# Patient Record
Sex: MALE | Race: BLACK OR AFRICAN AMERICAN | Employment: OTHER | URBAN - METROPOLITAN AREA
[De-identification: names, ages, dates, MRNs, and addresses within clinical notes are randomized per-mention and may not be internally consistent; named-entity substitution may affect disease eponyms.]

---

## 2020-12-18 ENCOUNTER — APPOINTMENT (OUTPATIENT)
Dept: GENERAL RADIOLOGY | Age: 60
End: 2020-12-18
Attending: EMERGENCY MEDICINE

## 2020-12-18 ENCOUNTER — APPOINTMENT (OUTPATIENT)
Dept: CT IMAGING | Age: 60
End: 2020-12-18
Attending: EMERGENCY MEDICINE

## 2020-12-18 ENCOUNTER — HOSPITAL ENCOUNTER (EMERGENCY)
Age: 60
Discharge: HOME | End: 2020-12-18
Attending: EMERGENCY MEDICINE

## 2020-12-18 VITALS
HEIGHT: 71 IN | HEART RATE: 98 BPM | OXYGEN SATURATION: 97 % | RESPIRATION RATE: 16 BRPM | DIASTOLIC BLOOD PRESSURE: 74 MMHG | WEIGHT: 228 LBS | BODY MASS INDEX: 31.92 KG/M2 | SYSTOLIC BLOOD PRESSURE: 116 MMHG

## 2020-12-18 DIAGNOSIS — R56.9 SEIZURE (HCC): Primary | ICD-10-CM

## 2020-12-18 DIAGNOSIS — T50.905A MEDICATION REACTION, INITIAL ENCOUNTER: ICD-10-CM

## 2020-12-18 DIAGNOSIS — M18.11 PRIMARY OSTEOARTHRITIS OF FIRST CARPOMETACARPAL JOINT OF RIGHT HAND: ICD-10-CM

## 2020-12-18 LAB
ALBUMIN SERPL-MCNC: 3.6 G/DL (ref 3.4–5)
ALBUMIN/GLOB SERPL: 1.1 {RATIO} (ref 0.8–1.7)
ALP SERPL-CCNC: 81 U/L (ref 45–117)
ALT SERPL-CCNC: 70 U/L (ref 16–61)
ANION GAP SERPL CALC-SCNC: 10 MMOL/L (ref 3–18)
APPEARANCE UR: ABNORMAL
AST SERPL-CCNC: 79 U/L (ref 10–38)
BACTERIA URNS QL MICRO: ABNORMAL /HPF
BASOPHILS # BLD: 0 K/UL (ref 0–0.1)
BASOPHILS NFR BLD: 0 % (ref 0–2)
BILIRUB SERPL-MCNC: 1.2 MG/DL (ref 0.2–1)
BILIRUB UR QL: NEGATIVE
BUN SERPL-MCNC: 17 MG/DL (ref 7–18)
BUN/CREAT SERPL: 10 (ref 12–20)
CALCIUM SERPL-MCNC: 9.3 MG/DL (ref 8.5–10.1)
CHLORIDE SERPL-SCNC: 100 MMOL/L (ref 100–111)
CK MB CFR SERPL CALC: 0.6 % (ref 0–4)
CK MB SERPL-MCNC: 1.4 NG/ML (ref 5–25)
CK SERPL-CCNC: 220 U/L (ref 39–308)
CO2 SERPL-SCNC: 30 MMOL/L (ref 21–32)
COLOR UR: ABNORMAL
CREAT SERPL-MCNC: 1.73 MG/DL (ref 0.6–1.3)
DIFFERENTIAL METHOD BLD: ABNORMAL
EOSINOPHIL # BLD: 0.1 K/UL (ref 0–0.4)
EOSINOPHIL NFR BLD: 1 % (ref 0–5)
EPITH CASTS URNS QL MICRO: ABNORMAL /LPF
EPITH CASTS URNS QL MICRO: ABNORMAL /LPF (ref 0–5)
ERYTHROCYTE [DISTWIDTH] IN BLOOD BY AUTOMATED COUNT: 14.9 % (ref 11.6–14.5)
ETHANOL SERPL-MCNC: <3 MG/DL (ref 0–3)
GLOBULIN SER CALC-MCNC: 3.4 G/DL (ref 2–4)
GLUCOSE SERPL-MCNC: 225 MG/DL (ref 74–99)
GLUCOSE UR STRIP.AUTO-MCNC: 250 MG/DL
HCT VFR BLD AUTO: 31.8 % (ref 36–48)
HGB BLD-MCNC: 10.4 G/DL (ref 13–16)
HGB UR QL STRIP: ABNORMAL
HYALINE CASTS URNS QL MICRO: ABNORMAL /LPF (ref 0–2)
KETONES UR QL STRIP.AUTO: ABNORMAL MG/DL
LEUKOCYTE ESTERASE UR QL STRIP.AUTO: NEGATIVE
LYMPHOCYTES # BLD: 4.4 K/UL (ref 0.9–3.6)
LYMPHOCYTES NFR BLD: 41 % (ref 21–52)
MAGNESIUM SERPL-MCNC: 2 MG/DL (ref 1.6–2.6)
MCH RBC QN AUTO: 28.1 PG (ref 24–34)
MCHC RBC AUTO-ENTMCNC: 32.7 G/DL (ref 31–37)
MCV RBC AUTO: 85.9 FL (ref 74–97)
MONOCYTES # BLD: 0.9 K/UL (ref 0.05–1.2)
MONOCYTES NFR BLD: 8 % (ref 3–10)
NEUTS SEG # BLD: 5.4 K/UL (ref 1.8–8)
NEUTS SEG NFR BLD: 50 % (ref 40–73)
NITRITE UR QL STRIP.AUTO: NEGATIVE
PH UR STRIP: 5 [PH] (ref 5–8)
PLATELET # BLD AUTO: 249 K/UL (ref 135–420)
PMV BLD AUTO: 10.3 FL (ref 9.2–11.8)
POTASSIUM SERPL-SCNC: 3.5 MMOL/L (ref 3.5–5.5)
PROT SERPL-MCNC: 7 G/DL (ref 6.4–8.2)
PROT UR STRIP-MCNC: 300 MG/DL
RBC # BLD AUTO: 3.7 M/UL (ref 4.7–5.5)
RBC #/AREA URNS HPF: ABNORMAL /HPF (ref 0–5)
SODIUM SERPL-SCNC: 140 MMOL/L (ref 136–145)
SP GR UR REFRACTOMETRY: 1.02 (ref 1–1.03)
TROPONIN I SERPL-MCNC: 0.02 NG/ML (ref 0–0.04)
URATE SERPL-MCNC: 8.6 MG/DL (ref 2.6–7.2)
UROBILINOGEN UR QL STRIP.AUTO: 1 EU/DL (ref 0.2–1)
WBC # BLD AUTO: 10.8 K/UL (ref 4.6–13.2)
WBC URNS QL MICRO: ABNORMAL /HPF (ref 0–5)

## 2020-12-18 PROCEDURE — 85025 COMPLETE CBC W/AUTO DIFF WBC: CPT

## 2020-12-18 PROCEDURE — 82550 ASSAY OF CK (CPK): CPT

## 2020-12-18 PROCEDURE — 94762 N-INVAS EAR/PLS OXIMTRY CONT: CPT

## 2020-12-18 PROCEDURE — 73110 X-RAY EXAM OF WRIST: CPT

## 2020-12-18 PROCEDURE — 80053 COMPREHEN METABOLIC PANEL: CPT

## 2020-12-18 PROCEDURE — 81001 URINALYSIS AUTO W/SCOPE: CPT

## 2020-12-18 PROCEDURE — 93005 ELECTROCARDIOGRAM TRACING: CPT

## 2020-12-18 PROCEDURE — 83735 ASSAY OF MAGNESIUM: CPT

## 2020-12-18 PROCEDURE — 84550 ASSAY OF BLOOD/URIC ACID: CPT

## 2020-12-18 PROCEDURE — 80307 DRUG TEST PRSMV CHEM ANLYZR: CPT

## 2020-12-18 PROCEDURE — 70450 CT HEAD/BRAIN W/O DYE: CPT

## 2020-12-18 PROCEDURE — 99285 EMERGENCY DEPT VISIT HI MDM: CPT

## 2020-12-18 RX ORDER — PREDNISONE 20 MG/1
60 TABLET ORAL DAILY
Qty: 15 TAB | Refills: 0 | Status: SHIPPED | OUTPATIENT
Start: 2020-12-18 | End: 2020-12-30

## 2020-12-19 ENCOUNTER — HOSPITAL ENCOUNTER (INPATIENT)
Age: 60
LOS: 11 days | Discharge: HOME | DRG: 682 | End: 2020-12-30
Attending: EMERGENCY MEDICINE | Admitting: FAMILY MEDICINE

## 2020-12-19 ENCOUNTER — APPOINTMENT (OUTPATIENT)
Dept: NUCLEAR MEDICINE | Age: 60
DRG: 682 | End: 2020-12-19
Attending: EMERGENCY MEDICINE

## 2020-12-19 ENCOUNTER — APPOINTMENT (OUTPATIENT)
Dept: GENERAL RADIOLOGY | Age: 60
DRG: 682 | End: 2020-12-19
Attending: EMERGENCY MEDICINE

## 2020-12-19 DIAGNOSIS — N17.9 ACUTE RENAL FAILURE, UNSPECIFIED ACUTE RENAL FAILURE TYPE (HCC): Primary | ICD-10-CM

## 2020-12-19 DIAGNOSIS — R94.31 ABNORMAL EKG: ICD-10-CM

## 2020-12-19 DIAGNOSIS — I95.9 HYPOTENSION, UNSPECIFIED HYPOTENSION TYPE: ICD-10-CM

## 2020-12-19 PROBLEM — R77.8 ELEVATED TROPONIN: Status: ACTIVE | Noted: 2020-12-19

## 2020-12-19 PROBLEM — R09.02 HYPOXIA: Status: ACTIVE | Noted: 2020-12-19

## 2020-12-19 LAB
ALBUMIN SERPL-MCNC: 3.7 G/DL (ref 3.4–5)
ALBUMIN/GLOB SERPL: 0.9 {RATIO} (ref 0.8–1.7)
ALP SERPL-CCNC: 82 U/L (ref 45–117)
ALT SERPL-CCNC: 98 U/L (ref 16–61)
ANION GAP SERPL CALC-SCNC: 14 MMOL/L (ref 3–18)
ARTERIAL PATENCY WRIST A: ABNORMAL
AST SERPL-CCNC: 69 U/L (ref 10–38)
ATRIAL RATE: 106 BPM
BASE EXCESS BLD CALC-SCNC: 2 MMOL/L
BASOPHILS # BLD: 0 K/UL (ref 0–0.1)
BASOPHILS NFR BLD: 0 % (ref 0–2)
BDY SITE: ABNORMAL
BILIRUB DIRECT SERPL-MCNC: 0.2 MG/DL (ref 0–0.2)
BILIRUB SERPL-MCNC: 1 MG/DL (ref 0.2–1)
BODY TEMPERATURE: 98
BUN SERPL-MCNC: 42 MG/DL (ref 7–18)
BUN/CREAT SERPL: 7 (ref 12–20)
CALCIUM SERPL-MCNC: 9 MG/DL (ref 8.5–10.1)
CALCULATED P AXIS, ECG09: 67 DEGREES
CALCULATED R AXIS, ECG10: 81 DEGREES
CALCULATED T AXIS, ECG11: 32 DEGREES
CHLORIDE SERPL-SCNC: 99 MMOL/L (ref 100–111)
CK MB CFR SERPL CALC: 0.8 % (ref 0–4)
CK MB SERPL-MCNC: 1.1 NG/ML (ref 5–25)
CK SERPL-CCNC: 134 U/L (ref 39–308)
CO2 SERPL-SCNC: 27 MMOL/L (ref 21–32)
CREAT SERPL-MCNC: 5.68 MG/DL (ref 0.6–1.3)
D DIMER PPP FEU-MCNC: 3 UG/ML(FEU)
DIAGNOSIS, 93000: NORMAL
DIFFERENTIAL METHOD BLD: ABNORMAL
EOSINOPHIL # BLD: 0 K/UL (ref 0–0.4)
EOSINOPHIL NFR BLD: 0 % (ref 0–5)
ERYTHROCYTE [DISTWIDTH] IN BLOOD BY AUTOMATED COUNT: 15.2 % (ref 11.6–14.5)
GAS FLOW.O2 O2 DELIVERY SYS: ABNORMAL L/MIN
GAS FLOW.O2 SETTING OXYMISER: 3.5 L/M
GLOBULIN SER CALC-MCNC: 3.9 G/DL (ref 2–4)
GLUCOSE SERPL-MCNC: 171 MG/DL (ref 74–99)
HCO3 BLD-SCNC: 26.6 MMOL/L (ref 22–26)
HCT VFR BLD AUTO: 28.9 % (ref 36–48)
HGB BLD-MCNC: 9.5 G/DL (ref 13–16)
LYMPHOCYTES # BLD: 2.3 K/UL (ref 0.9–3.6)
LYMPHOCYTES NFR BLD: 16 % (ref 21–52)
MAGNESIUM SERPL-MCNC: 2.2 MG/DL (ref 1.6–2.6)
MCH RBC QN AUTO: 27.9 PG (ref 24–34)
MCHC RBC AUTO-ENTMCNC: 32.9 G/DL (ref 31–37)
MCV RBC AUTO: 84.8 FL (ref 74–97)
MONOCYTES # BLD: 1.1 K/UL (ref 0.05–1.2)
MONOCYTES NFR BLD: 8 % (ref 3–10)
NEUTS SEG # BLD: 10.7 K/UL (ref 1.8–8)
NEUTS SEG NFR BLD: 76 % (ref 40–73)
O2/TOTAL GAS SETTING VFR VENT: 0.38 %
OSMOLALITY SERPL: 309 MOSM/KG H2O (ref 280–301)
P-R INTERVAL, ECG05: 142 MS
PCO2 BLD: 39.8 MMHG (ref 35–45)
PH BLD: 7.43 [PH] (ref 7.35–7.45)
PHOSPHATE SERPL-MCNC: 4.9 MG/DL (ref 2.5–4.9)
PLATELET # BLD AUTO: 269 K/UL (ref 135–420)
PMV BLD AUTO: 11 FL (ref 9.2–11.8)
PO2 BLD: 190 MMHG (ref 80–100)
POTASSIUM SERPL-SCNC: 4.6 MMOL/L (ref 3.5–5.5)
PROT SERPL-MCNC: 7.6 G/DL (ref 6.4–8.2)
Q-T INTERVAL, ECG07: 356 MS
QRS DURATION, ECG06: 90 MS
QTC CALCULATION (BEZET), ECG08: 472 MS
RBC # BLD AUTO: 3.41 M/UL (ref 4.7–5.5)
SAO2 % BLD: 100 % (ref 92–97)
SERVICE CMNT-IMP: ABNORMAL
SODIUM SERPL-SCNC: 140 MMOL/L (ref 136–145)
SPECIMEN TYPE: ABNORMAL
TOTAL RESP. RATE, ITRR: 22
TROPONIN I SERPL-MCNC: 0.19 NG/ML (ref 0–0.04)
VENTRICULAR RATE, ECG03: 106 BPM
WBC # BLD AUTO: 14 K/UL (ref 4.6–13.2)

## 2020-12-19 PROCEDURE — 83930 ASSAY OF BLOOD OSMOLALITY: CPT

## 2020-12-19 PROCEDURE — 82803 BLOOD GASES ANY COMBINATION: CPT

## 2020-12-19 PROCEDURE — 65660000000 HC RM CCU STEPDOWN

## 2020-12-19 PROCEDURE — 84100 ASSAY OF PHOSPHORUS: CPT

## 2020-12-19 PROCEDURE — 80048 BASIC METABOLIC PNL TOTAL CA: CPT

## 2020-12-19 PROCEDURE — 93005 ELECTROCARDIOGRAM TRACING: CPT

## 2020-12-19 PROCEDURE — 83735 ASSAY OF MAGNESIUM: CPT

## 2020-12-19 PROCEDURE — 71045 X-RAY EXAM CHEST 1 VIEW: CPT

## 2020-12-19 PROCEDURE — A9540 TC99M MAA: HCPCS

## 2020-12-19 PROCEDURE — 74011250636 HC RX REV CODE- 250/636: Performed by: EMERGENCY MEDICINE

## 2020-12-19 PROCEDURE — 85379 FIBRIN DEGRADATION QUANT: CPT

## 2020-12-19 PROCEDURE — 85025 COMPLETE CBC W/AUTO DIFF WBC: CPT

## 2020-12-19 PROCEDURE — 36600 WITHDRAWAL OF ARTERIAL BLOOD: CPT

## 2020-12-19 PROCEDURE — 80076 HEPATIC FUNCTION PANEL: CPT

## 2020-12-19 PROCEDURE — 99285 EMERGENCY DEPT VISIT HI MDM: CPT

## 2020-12-19 PROCEDURE — 82553 CREATINE MB FRACTION: CPT

## 2020-12-19 RX ORDER — HEPARIN SODIUM 5000 [USP'U]/ML
5000 INJECTION, SOLUTION INTRAVENOUS; SUBCUTANEOUS EVERY 8 HOURS
Status: DISCONTINUED | OUTPATIENT
Start: 2020-12-20 | End: 2020-12-27

## 2020-12-19 RX ORDER — SODIUM CHLORIDE 0.9 % (FLUSH) 0.9 %
5-40 SYRINGE (ML) INJECTION AS NEEDED
Status: DISCONTINUED | OUTPATIENT
Start: 2020-12-19 | End: 2020-12-30 | Stop reason: HOSPADM

## 2020-12-19 RX ORDER — SODIUM CHLORIDE 0.9 % (FLUSH) 0.9 %
5-40 SYRINGE (ML) INJECTION EVERY 8 HOURS
Status: DISCONTINUED | OUTPATIENT
Start: 2020-12-20 | End: 2020-12-30 | Stop reason: HOSPADM

## 2020-12-19 RX ADMIN — SODIUM CHLORIDE 1000 ML: 900 INJECTION, SOLUTION INTRAVENOUS at 19:47

## 2020-12-19 NOTE — ED TRIAGE NOTES
Patient arrives from home via EMS c/o of possible seizure. Patient was eating dinner and began shaking. Brother caught patient and lowered him to the floor. No injuries from event. Patient is AOX4. Per EMS patient diaphoretic and hypotensive on seen. Patient diaphoretic with bp of 96/53    [facility] since [facility]. Given Indomethacin at 1530 for pain and then again at 1800. Only supposed to take every 8 hours.

## 2020-12-19 NOTE — DISCHARGE INSTRUCTIONS
Learning About Arthritis at the [facility] of the Thumb  What is it? Arthritis at the base of the thumb joint is wear and tear on the cartilage. Cartilage is a firm, thick, slippery tissue. It covers and protects the ends of bones where they meet to form a joint. When you have arthritis, there are changes in the cartilage that cause it to break down. The bones rub together and cause joint damage and pain. What causes it? Experts don't know what causes arthritis at the base of the thumb. But aging, a lot of use, an injury, or family history may play a part. What are the symptoms? Symptoms of arthritis at the base of the thumb include aching in your joint. Or the pain may feel burning or sharp. You may feel clicking, creaking, or catching in the joint. It may get stiff. You may have more pain and less strength when you pinch or  things. Symptoms may come and go, stay the same, or get worse over time. How is it diagnosed? Your doctor can often diagnose arthritis by asking you questions about your joint pain and other symptoms and examining you. You may also have X-rays and blood tests. Blood tests can help make sure another disease isn't causing your symptoms. How is it treated? Arthritis at the base of your thumb may be treated with rest, pain relievers, steroid medicines, using a brace or splint, and--in some cases--surgery. To help relieve pain in the joint, rest your sore hand. Switch hands for some activities. You can try heat and cold therapy, such as hot compresses, paraffin wax, cold packs, or ice massage. Your doctor may give you a splint to wear during some activities or when pain flares up. You can often manage mild or moderate arthritis pain with over-the-counter pain relievers. These include medicines that reduce swelling, such as ibuprofen or naproxen. You can also use acetaminophen. Sometimes these medicines are in creams that you can rub on your thumb and hand.  Your doctor may also prescribe other medicine for your pain. For some people, steroid shots may be an option. If none of the treatments work, your doctor may discuss surgery with you. Follow-up care is a key part of your treatment and safety. Be sure to make and go to all appointments, and call your doctor if you are having problems. It's also a good idea to know your test results and keep a list of the medicines you take. Where can you learn more? Go to http://www.gray.com/  Enter T110 in the search box to learn more about \"Learning About Arthritis at the [facility] of [facility]umb. \"  Current as of: December 9, 2019               Content Version: 12.6  © 6425-8728 [employer]. Care instructions adapted under license by [employer] (which disclaims liability or warranty for this information). If you have questions about a medical condition or this instruction, always ask your healthcare professional. [place] [address] any warranty or liability for your use of this information. Patient Education        Seizure: Care Instructions  Your Care Instructions     Seizures are caused by abnormal patterns of electrical signals in the brain. They are different for each person. Seizures can affect movement, speech, vision, or awareness. Some people have only slight shaking of a hand and do not pass out. Other people may pass out and have violent shaking of the whole body. Some people appear to stare into space. They are awake, but they can't respond normally. Later, they may not remember what happened. You may need tests to identify the type and cause of the seizures. A seizure may occur only once, or you may have them more than one time. Taking medicines as directed and following up with your doctor may help keep you from having more seizures. The doctor has checked you carefully, but problems can develop later.  If you notice any problems or new symptoms, get medical treatment right away. Follow-up care is a key part of your treatment and safety. Be sure to make and go to all appointments, and call your doctor if you are having problems. It's also a good idea to know your test results and keep a list of the medicines you take. How can you care for yourself at home? · Be safe with medicines. Take your medicines exactly as prescribed. Call your doctor if you think you are having a problem with your medicine. · Do not do any activity that could be dangerous to you or others until your doctor says it is safe to do so. For example, do not drive a car, operate machinery, swim, or climb ladders. · Be sure that anyone treating you for any health problem knows that you have had a seizure and what medicines you are taking for it. · Identify and avoid things that may make you more likely to have a seizure. These may include lack of sleep, alcohol or drug use, stress, or not eating. · Make sure you go to your follow-up appointment. When should you call for help? Call 911 anytime you think you may need emergency care. For example, call if:    · You have another seizure.     · You have new symptoms, such as trouble walking, speaking, or thinking clearly. Call your doctor now or seek immediate medical care if:    · You are not acting normally. Watch closely for changes in your health, and be sure to contact your doctor if you have any problems. Where can you learn more? Go to http://www.gray.com/  Enter M769 in the search box to learn more about \"Seizure: Care Instructions. \"  Current as of: November 20, 2019               Content Version: 12.6  © 6021-3231 [employer]. Care instructions adapted under license by [employer] (which disclaims liability or warranty for this information).  If you have questions about a medical condition or this instruction, always ask your healthcare professional. Jose Guadalupe Martines any warranty or liability for your use of this information.

## 2020-12-19 NOTE — ED NOTES
I have reviewed discharge instructions with the patient. The patient verbalized understanding. Discharge medications reviewed with patient and appropriate educational materials and side effects teaching were provided. NAD. VSS. Ambulatory with steady gait. AOX4. no chest pain and no edema.

## 2020-12-19 NOTE — ED PROVIDER NOTES
EMERGENCY DEPARTMENT HISTORY AND PHYSICAL EXAM    Date: 12/18/2020  Patient Name: Mylene Green    History of Presenting Illness     Chief Complaint   Patient presents with    Seizure         History Provided By: Patient    Additional History (Context):   8:42 PM  Mylene Green is a 56 y.o. male with PMHX of no long-term medical problems who presents to the emergency department C/O seizures. Patient was seen his regular doctor's office and given a prescription for Indocin for swollen wrist.  He took 2 doses of medications, first at 330 then followed again at next p.m. and then had a witnessed episode of his eyes rolling back and as well as his drooling and urinary incontinence and upper body shaking according to his family. Paramedics were called who arrived and found him in a sleepy-like state. Blood sugars were checked and found to be within normal limits he was transported to the hospital with a hypotensive state. No further history was available. Social History  No history of smoking drinking or drugs    Family History  Family history negative for to seizure disorders or malignancies      PCP: Other, MD Sriram        Past History     Past Medical History:  History reviewed. No pertinent past medical history. Past Surgical History:  History reviewed. No pertinent surgical history. Family History:  History reviewed. No pertinent family history. Social History:  Social History     Tobacco Use    Smoking status: Never Smoker    Smokeless tobacco: Never Used   Substance Use Topics    Alcohol use: Yes     Alcohol/week: 2.0 standard drinks     Types: 2 Cans of beer per week    Drug use: Not Currently       Allergies:  No Known Allergies      Review of Systems   Review of Systems   Constitutional: Positive for activity change. Negative for chills, fatigue and fever. HENT: Negative. Eyes: Negative. Respiratory: Negative. Cardiovascular: Negative. Gastrointestinal: Negative. Endocrine: Negative. Genitourinary: Negative. Musculoskeletal: Positive for arthralgias. Skin: Negative. Allergic/Immunologic: Negative. Neurological: Positive for seizures. Negative for syncope, facial asymmetry, speech difficulty and weakness. Hematological: Negative. Psychiatric/Behavioral: Negative. All other systems reviewed and are negative. Physical Exam     Vitals:    12/18/20 2205 12/18/20 2251 12/18/20 2300 12/18/20 2315   BP: 114/67 121/89 117/75 116/74   Pulse: 98      Resp: 16      SpO2: 96% 97% 96% 97%   Weight:       Height:         Physical Exam  Vitals signs and nursing note reviewed. Constitutional:       General: He is not in acute distress. Appearance: He is well-developed. He is not diaphoretic. HENT:      Head: Normocephalic and atraumatic. Eyes:      General: No visual field deficit or scleral icterus. Extraocular Movements:      Right eye: Normal extraocular motion. Left eye: Normal extraocular motion. Conjunctiva/sclera: Conjunctivae normal.      Pupils: Pupils are equal, round, and reactive to light. Neck:      Musculoskeletal: Normal range of motion and neck supple. Trachea: No tracheal deviation. Cardiovascular:      Rate and Rhythm: Normal rate and regular rhythm. Heart sounds: Normal heart sounds. Pulmonary:      Effort: Pulmonary effort is normal. No respiratory distress. Breath sounds: Normal breath sounds. No stridor. Abdominal:      General: Bowel sounds are normal. There is no distension. Palpations: Abdomen is soft. Tenderness: There is no abdominal tenderness. There is no rebound. Musculoskeletal: Normal range of motion. General: No tenderness. Comments: Grossly unremarkable without abnormalities   Skin:     General: Skin is warm and dry. Capillary Refill: Capillary refill takes less than 2 seconds. Findings: No erythema or rash.    Neurological:      Mental Status: He is alert and oriented to person, place, and time. GCS: GCS eye subscore is 4. GCS verbal subscore is 5. GCS motor subscore is 6. Cranial Nerves: No cranial nerve deficit, dysarthria or facial asymmetry. Motor: No weakness. Deep Tendon Reflexes: Reflexes are normal and symmetric. Psychiatric:         Attention and Perception: He is attentive. Mood and Affect: Mood normal.         Speech: He is communicative. Speech is delayed. Speech is not slurred. Behavior: Behavior normal.         Thought Content: Thought content normal.         Judgment: Judgment normal.         Diagnostic Study Results     Labs -     Recent Results (from the past 12 hour(s))   EKG, 12 LEAD, INITIAL    Collection Time: 12/18/20  8:02 PM   Result Value Ref Range    Ventricular Rate 106 BPM    Atrial Rate 106 BPM    P-R Interval 142 ms    QRS Duration 90 ms    Q-T Interval 356 ms    QTC Calculation (Bezet) 472 ms    Calculated P Axis 67 degrees    Calculated R Axis 81 degrees    Calculated T Axis 32 degrees    Diagnosis       Sinus tachycardia  Nonspecific ST abnormality  Abnormal ECG  No previous ECGs available     METABOLIC PANEL, COMPREHENSIVE    Collection Time: 12/18/20  8:05 PM   Result Value Ref Range    Sodium 140 136 - 145 mmol/L    Potassium 3.5 3.5 - 5.5 mmol/L    Chloride 100 100 - 111 mmol/L    CO2 30 21 - 32 mmol/L    Anion gap 10 3.0 - 18 mmol/L    Glucose 225 (H) 74 - 99 mg/dL    BUN 17 7.0 - 18 MG/DL    Creatinine 1.73 (H) 0.6 - 1.3 MG/DL    BUN/Creatinine ratio 10 (L) 12 - 20      GFR est AA 49 (L) >60 ml/min/1.73m2    GFR est non-AA 40 (L) >60 ml/min/1.73m2    Calcium 9.3 8.5 - 10.1 MG/DL    Bilirubin, total 1.2 (H) 0.2 - 1.0 MG/DL    ALT (SGPT) 70 (H) 16 - 61 U/L    AST (SGOT) 79 (H) 10 - 38 U/L    Alk.  phosphatase 81 45 - 117 U/L    Protein, total 7.0 6.4 - 8.2 g/dL    Albumin 3.6 3.4 - 5.0 g/dL    Globulin 3.4 2.0 - 4.0 g/dL    A-G Ratio 1.1 0.8 - 1.7     CBC WITH AUTOMATED DIFF Collection Time: 12/18/20  8:05 PM   Result Value Ref Range    WBC 10.8 4.6 - 13.2 K/uL    RBC 3.70 (L) 4.70 - 5.50 M/uL    HGB 10.4 (L) 13.0 - 16.0 g/dL    HCT 31.8 (L) 36.0 - 48.0 %    MCV 85.9 74.0 - 97.0 FL    MCH 28.1 24.0 - 34.0 PG    MCHC 32.7 31.0 - 37.0 g/dL    RDW 14.9 (H) 11.6 - 14.5 %    PLATELET 374 785 - 743 K/uL    MPV 10.3 9.2 - 11.8 FL    NEUTROPHILS 50 40 - 73 %    LYMPHOCYTES 41 21 - 52 %    MONOCYTES 8 3 - 10 %    EOSINOPHILS 1 0 - 5 %    BASOPHILS 0 0 - 2 %    ABS. NEUTROPHILS 5.4 1.8 - 8.0 K/UL    ABS. LYMPHOCYTES 4.4 (H) 0.9 - 3.6 K/UL    ABS. MONOCYTES 0.9 0.05 - 1.2 K/UL    ABS. EOSINOPHILS 0.1 0.0 - 0.4 K/UL    ABS. BASOPHILS 0.0 0.0 - 0.1 K/UL    DF AUTOMATED     MAGNESIUM    Collection Time: 12/18/20  8:05 PM   Result Value Ref Range    Magnesium 2.0 1.6 - 2.6 mg/dL   CARDIAC PANEL,(CK, CKMB & TROPONIN)    Collection Time: 12/18/20  8:05 PM   Result Value Ref Range    CK - MB 1.4 <3.6 ng/ml    CK-MB Index 0.6 0.0 - 4.0 %     39 - 308 U/L    Troponin-I, QT 0.02 0.0 - 0.045 NG/ML   ETHYL ALCOHOL    Collection Time: 12/18/20  8:05 PM   Result Value Ref Range    ALCOHOL(ETHYL),SERUM <3 0 - 3 MG/DL       Radiologic Studies -   XR WRIST RT AP/LAT/OBL MIN 3V   Final Result   IMPRESSION:   1. 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 No fracture. CT HEAD WO CONT   Final Result   IMPRESSION:      No CT evidence of an acute intracranial abnormality - in particular, no acute   cortical infarct, hemorrhage, or mass effect. CT Results  (Last 48 hours)               12/18/20 2042  CT HEAD WO CONT Final result    Impression:  IMPRESSION:       No CT evidence of an acute intracranial abnormality - in particular, no acute   cortical infarct, hemorrhage, or mass effect.        Narrative:  EXAM: CT HEAD, WITHOUT IV CONTRAST       COMPARISON: None       INDICATION: Possible seizure       TECHNIQUE: Multiple axial CT images of the head were obtained extending from the   skull base through the vertex. Additional coronal and sagittal reformations were   also performed. One or more dose reduction techniques were used on this CT:   automated exposure control, adjustment of the mAs and/or kVp according to   patient size, and iterative reconstruction techniques. The specific techniques   used on this CT exam have been documented in the patient's electronic medical   record. Digital Imaging and Communications in Medicine (DICOM) format image   data are available to nonaffiliated external healthcare facilities or entities   on a secure, media free, reciprocally searchable basis with patient   authorization for at least a 12-month period after this study. _______________       FINDINGS:       BRAIN AND POSTERIOR FOSSA: The sulci, folia, ventricles and basal cisterns are   within normal limits for the patient's age. There is no intracranial hemorrhage,   mass effect, or midline shift. There are no areas of abnormal parenchymal   attenuation. EXTRA-AXIAL SPACES AND MENINGES: There are no abnormal extra-axial fluid   collections. CALVARIUM: No acute osseous abnormality. OTHER: The visualized portions of the paranasal sinuses and mastoid air cells   are clear.        _______________               CXR Results  (Last 48 hours)    None          Medications given in the ED-  Medications - No data to display      Medical Decision Making   I am the first provider for this patient. I reviewed the vital signs, available nursing notes, past medical history, past surgical history, family history and social history. Vital Signs-Reviewed the patient's vital signs. Pulse Oximetry Analysis -97% on male    Cardiac Monitor:  Rate: 95 bpm  Rhythm:  This rhythm    EKG interpretation: (Preliminary)  8:05 PM   Sinus tachycardia, rate 106, nonspecific ST segments,   EKG read by Jeevan Sagastume MD      Records Reviewed: NURSING NOTES AND PREVIOUS MEDICAL RECORDS    Provider Notes (Medical Decision Making):   Patient and paramedics describe a classic case of new onset seizures disorder however this is somewhat unusual in a 25-year-old male. Primary concern would be a new space-occupying lesion or bleed within the intracranial space however no was found on CT. It is unlikely this demonstrates meningitis or encephalitis. Blood work was sent also to look for salt electrolyte problems. Uric acid was sent to evaluate for the starting of Indocin on the wrist as it was a vague history is whether or not this was spontaneous or injury related. He was not sure whether or not he hurt anything else today when he was having his episode. Patient is amnestic to the event that occurred this evening. Blood work were found no evidence of electrolyte problems to be corrected. Cardiac enzymes were unremarkable and monitor demonstrated no new problems. Case was further discussed with our neurologist who agreed with the work-up and stated patient would be appropriate for outpatient follow-up for EKG. Procedures:  Procedures    ED Course:   8:42 PM: Initial assessment performed. The patients presenting problems have been discussed, and they are in agreement with the care plan formulated and outlined with them. I have encouraged them to ask questions as they arise throughout their visit. CONSULT NOTE:   9:17 PM  Milton Lang. Renee Finley MD spoke with Dora Conti MD  Specialty: Neurology  Discussed pt's hx, disposition, and available diagnostic and imaging results  over the telephone. Reviewed care plans. Consulting physician agrees with plans as outlined. Agrees with work-up patient will be sent outpatient to evaluate for EEG we would hold on starting any new anticonvulsions. .        Diagnosis and Disposition       DISCHARGE NOTE:  11:25 PM  Tyrone Madera's  results have been reviewed with him. He has been counseled regarding his diagnosis, treatment, and plan.   He verbally conveys understanding and agreement of the signs, symptoms, diagnosis, treatment and prognosis and additionally agrees to follow up as discussed. He also agrees with the care-plan and conveys that all of his questions have been answered. I have also provided discharge instructions for him that include: educational information regarding their diagnosis and treatment, and list of reasons why they would want to return to the ED prior to their follow-up appointment, should his condition change. He has been provided with education for proper emergency department utilization. CLINICAL IMPRESSION:    1. Seizure ([address]    2. Medication reaction, initial encounter    3. Primary osteoarthritis of first carpometacarpal joint of right hand        PLAN:  1. D/C Home  2. Discharge Medication List as of 12/18/2020 11:34 PM      START taking these medications    Details   predniSONE (DELTASONE) 20 mg tablet Take 60 mg by mouth daily for 5 days. With Breakfast, Normal, Disp-15 Tab, R-0           3. Follow-up Information     Follow up With Specialties Details [place]  In 1 week  [address] [facility] [address] [facility]    Paul Nuñez MD Neurology In 1 week  [address] [facility] [place] [address]      Praneeth Dang MD Neurology In 1 week  [address] [place] [address]      [facility] EMERGENCY DEPT Emergency Medicine  As needed [address] [address]  [phone]        _______________________________    This note was partially transcribed via voice recognition software. Although efforts have been made to catch any discrepancies, it may contain sound alike words, grammatical errors, or nonsensical words.

## 2020-12-20 ENCOUNTER — APPOINTMENT (OUTPATIENT)
Dept: CT IMAGING | Age: 60
DRG: 682 | End: 2020-12-20
Attending: HOSPITALIST

## 2020-12-20 ENCOUNTER — APPOINTMENT (OUTPATIENT)
Dept: NON INVASIVE DIAGNOSTICS | Age: 60
DRG: 682 | End: 2020-12-20
Attending: FAMILY MEDICINE

## 2020-12-20 ENCOUNTER — APPOINTMENT (OUTPATIENT)
Dept: ULTRASOUND IMAGING | Age: 60
DRG: 682 | End: 2020-12-20
Attending: FAMILY MEDICINE

## 2020-12-20 PROBLEM — R14.0 DISTENDED ABDOMEN: Status: ACTIVE | Noted: 2020-12-20

## 2020-12-20 PROBLEM — D64.9 ANEMIA: Status: ACTIVE | Noted: 2020-12-20

## 2020-12-20 LAB
AMORPH CRY URNS QL MICRO: POSITIVE
AMPHET UR QL SCN: NEGATIVE
ANION GAP SERPL CALC-SCNC: 11 MMOL/L (ref 3–18)
APPEARANCE UR: ABNORMAL
APTT PPP: 28.6 SEC (ref 23–36.4)
ATRIAL RATE: 114 BPM
AV VELOCITY RATIO: 0.71
AV VTI RATIO: 0.8
BACTERIA URNS QL MICRO: ABNORMAL /HPF
BARBITURATES UR QL SCN: NEGATIVE
BASOPHILS # BLD: 0 K/UL (ref 0–0.1)
BASOPHILS NFR BLD: 0 % (ref 0–2)
BENZODIAZ UR QL: NEGATIVE
BILIRUB UR QL: NEGATIVE
BUN SERPL-MCNC: 51 MG/DL (ref 7–18)
BUN/CREAT SERPL: 8 (ref 12–20)
CALCIUM SERPL-MCNC: 8.2 MG/DL (ref 8.5–10.1)
CALCULATED P AXIS, ECG09: 63 DEGREES
CALCULATED R AXIS, ECG10: 77 DEGREES
CALCULATED T AXIS, ECG11: -48 DEGREES
CANNABINOIDS UR QL SCN: NEGATIVE
CHLORIDE SERPL-SCNC: 102 MMOL/L (ref 100–111)
CK MB CFR SERPL CALC: 1.1 % (ref 0–4)
CK MB CFR SERPL CALC: 1.9 % (ref 0–4)
CK MB SERPL-MCNC: 1.1 NG/ML (ref 5–25)
CK MB SERPL-MCNC: 3.3 NG/ML (ref 5–25)
CK SERPL-CCNC: 100 U/L (ref 39–308)
CK SERPL-CCNC: 174 U/L (ref 39–308)
CO2 SERPL-SCNC: 25 MMOL/L (ref 21–32)
COCAINE UR QL SCN: NEGATIVE
COLOR UR: YELLOW
CREAT SERPL-MCNC: 6.13 MG/DL (ref 0.6–1.3)
CREAT UR-MCNC: 333 MG/DL (ref 30–125)
CREAT UR-MCNC: 338 MG/DL (ref 30–125)
DIAGNOSIS, 93000: NORMAL
DIFFERENTIAL METHOD BLD: ABNORMAL
ECHO AO ASC DIAM: 3.19 CM
ECHO AV ANNULUS DIAM: 3.15 CM
ECHO AV AREA PEAK VELOCITY: 2.5 CM2
ECHO AV AREA VTI: 2.8 CM2
ECHO AV AREA/BSA PEAK VELOCITY: 1.1 CM2/M2
ECHO AV AREA/BSA VTI: 1.2 CM2/M2
ECHO AV MEAN GRADIENT: 4.2 MMHG
ECHO AV MEAN VELOCITY: 0.95 M/S
ECHO AV PEAK GRADIENT: 8.1 MMHG
ECHO AV PEAK VELOCITY: 142.46 CM/S
ECHO AV VTI: 18.37 CM
ECHO LA AREA 2C: 9.74 CM2
ECHO LA AREA 4C: 16.1 CM2
ECHO LA MAJOR AXIS: 2.68 CM
ECHO LA MINOR AXIS: 1.16 CM
ECHO LA VOL 2C: 21.9 ML (ref 18–58)
ECHO LA VOL 4C: 34.14 ML (ref 18–58)
ECHO LA VOL BP: 37.38 ML (ref 18–58)
ECHO LA VOLUME INDEX A2C: 9.51 ML/M2 (ref 16–28)
ECHO LA VOLUME INDEX A4C: 14.83 ML/M2 (ref 16–28)
ECHO LV E' LATERAL VELOCITY: 5 CM/S
ECHO LV E' SEPTAL VELOCITY: 8 CM/S
ECHO LV EDV A2C: 55.1 ML
ECHO LV EDV A4C: 95.9 ML
ECHO LV EDV BP: 75 ML (ref 67–155)
ECHO LV EDV INDEX A4C: 41.7 ML/M2
ECHO LV EDV INDEX BP: 32.6 ML/M2
ECHO LV EDV NDEX A2C: 23.9 ML/M2
ECHO LV EDV TEICHHOLZ: 0.55 ML
ECHO LV EJECTION FRACTION A2C: 66 %
ECHO LV EJECTION FRACTION A4C: 74 %
ECHO LV EJECTION FRACTION BIPLANE: 70 % (ref 55–100)
ECHO LV ESV A2C: 19 ML
ECHO LV ESV A4C: 25.4 ML
ECHO LV ESV BP: 22.5 ML (ref 22–58)
ECHO LV ESV INDEX A2C: 8.3 ML/M2
ECHO LV ESV INDEX A4C: 11 ML/M2
ECHO LV ESV INDEX BP: 9.8 ML/M2
ECHO LV ESV TEICHHOLZ: 0.15 ML
ECHO LV INTERNAL DIMENSION DIASTOLIC: 4.67 CM (ref 4.2–5.9)
ECHO LV INTERNAL DIMENSION SYSTOLIC: 2.72 CM
ECHO LV IVSD: 1.66 CM (ref 0.6–1)
ECHO LV MASS 2D: 312.2 G (ref 88–224)
ECHO LV MASS INDEX 2D: 135.6 G/M2 (ref 49–115)
ECHO LV POSTERIOR WALL DIASTOLIC: 1.48 CM (ref 0.6–1)
ECHO LV POSTERIOR WALL SYSTOLIC: 0 CM
ECHO LVOT CARDIAC OUTPUT: 5.3 L/MIN
ECHO LVOT DIAM: 2.14 CM
ECHO LVOT PEAK GRADIENT: 4 MMHG
ECHO LVOT PEAK VELOCITY: 100.55 CM/S
ECHO LVOT SV: 52.2 ML
ECHO LVOT SV: 52.3 ML
ECHO LVOT VTI: 14.47 CM
ECHO MV A VELOCITY: 95.8 CM/S
ECHO MV AREA PHT: 3.8 CM2
ECHO MV E DECELERATION TIME (DT): 199.3 MS
ECHO MV E VELOCITY: 71.16 CM/S
ECHO MV E/A RATIO: 0.74
ECHO MV E/E' LATERAL: 14.23
ECHO MV E/E' RATIO (AVERAGED): 11.56
ECHO MV E/E' SEPTAL: 8.9
ECHO MV PRESSURE HALF TIME (PHT): 57.8 MS
ECHO RA AREA 4C: 18.81 CM2
ECHO RA VOLUME: 51.7 ML
ECHO RV INTERNAL DIMENSION: 4.04 CM
ECHO RVOT DIAMETER: 0 CM
ECHO TV REGURGITANT MAX VELOCITY: 277.68 CM/S
ECHO TV REGURGITANT PEAK GRADIENT: 30.8 MMHG
EOSINOPHIL # BLD: 0 K/UL (ref 0–0.4)
EOSINOPHIL #/AREA URNS HPF: NORMAL /[HPF]
EOSINOPHIL NFR BLD: 0 % (ref 0–5)
ERYTHROCYTE [DISTWIDTH] IN BLOOD BY AUTOMATED COUNT: 15.3 % (ref 11.6–14.5)
GLUCOSE SERPL-MCNC: 147 MG/DL (ref 74–99)
GLUCOSE UR STRIP.AUTO-MCNC: NEGATIVE MG/DL
GRAN CASTS URNS QL MICRO: POSITIVE /LPF
HCT VFR BLD AUTO: 24.3 % (ref 36–48)
HCT VFR BLD AUTO: 24.8 % (ref 36–48)
HDSCOM,HDSCOM: NORMAL
HGB BLD-MCNC: 8 G/DL (ref 13–16)
HGB BLD-MCNC: 8.2 G/DL (ref 13–16)
HGB UR QL STRIP: ABNORMAL
HYALINE CASTS URNS QL MICRO: POSITIVE /LPF (ref 0–2)
INR PPP: 1.1 (ref 0.8–1.2)
KETONES UR QL STRIP.AUTO: ABNORMAL MG/DL
LEUKOCYTE ESTERASE UR QL STRIP.AUTO: NEGATIVE
LVFS 2D: 41.72 %
LVOT MG: 2.08 MMHG
LVOT MV: 0.65 CM/S
LVSV (MOD BI): 22.32 ML
LVSV (MOD SINGLE 4C): 29.97 ML
LVSV (MOD SINGLE): 15.35 ML
LVSV (TEICH): 31.19 ML
LYMPHOCYTES # BLD: 1.8 K/UL (ref 0.9–3.6)
LYMPHOCYTES NFR BLD: 14 % (ref 21–52)
MAGNESIUM SERPL-MCNC: 2.2 MG/DL (ref 1.6–2.6)
MCH RBC QN AUTO: 28.1 PG (ref 24–34)
MCHC RBC AUTO-ENTMCNC: 33.1 G/DL (ref 31–37)
MCV RBC AUTO: 84.9 FL (ref 74–97)
METHADONE UR QL: NEGATIVE
MONOCYTES # BLD: 1.2 K/UL (ref 0.05–1.2)
MONOCYTES NFR BLD: 10 % (ref 3–10)
MUCOUS THREADS URNS QL MICRO: ABNORMAL /LPF
MV DEC SLOPE: 3.57
NEUTS SEG # BLD: 9.7 K/UL (ref 1.8–8)
NEUTS SEG NFR BLD: 76 % (ref 40–73)
NITRITE UR QL STRIP.AUTO: NEGATIVE
OPIATES UR QL: NEGATIVE
OSMOLALITY UR: 350 MOSM/KG H2O (ref 50–1400)
P-R INTERVAL, ECG05: 132 MS
PCP UR QL: NEGATIVE
PH UR STRIP: 5 [PH] (ref 5–8)
PHOSPHATE SERPL-MCNC: 5.4 MG/DL (ref 2.5–4.9)
PLATELET # BLD AUTO: 208 K/UL (ref 135–420)
PMV BLD AUTO: 9.7 FL (ref 9.2–11.8)
POTASSIUM SERPL-SCNC: 4.7 MMOL/L (ref 3.5–5.5)
PROT UR STRIP-MCNC: 100 MG/DL
PROT UR-MCNC: 164 MG/DL
PROT/CREAT UR-RTO: 0.5
PROTHROMBIN TIME: 14.4 SEC (ref 11.5–15.2)
Q-T INTERVAL, ECG07: 332 MS
QRS DURATION, ECG06: 84 MS
QTC CALCULATION (BEZET), ECG08: 457 MS
RBC # BLD AUTO: 2.92 M/UL (ref 4.7–5.5)
RBC #/AREA URNS HPF: ABNORMAL /HPF (ref 0–5)
SODIUM SERPL-SCNC: 138 MMOL/L (ref 136–145)
SODIUM UR-SCNC: 37 MMOL/L (ref 20–110)
SP GR UR REFRACTOMETRY: 1.02 (ref 1–1.03)
TROPONIN I SERPL-MCNC: 0.08 NG/ML (ref 0–0.04)
TROPONIN I SERPL-MCNC: 0.14 NG/ML (ref 0–0.04)
UROBILINOGEN UR QL STRIP.AUTO: 1 EU/DL (ref 0.2–1)
VENTRICULAR RATE, ECG03: 114 BPM
WAXY CASTS URNS QL MICRO: POSITIVE /LPF
WBC # BLD AUTO: 12.7 K/UL (ref 4.6–13.2)
WBC URNS QL MICRO: ABNORMAL /HPF (ref 0–5)

## 2020-12-20 PROCEDURE — 87205 SMEAR GRAM STAIN: CPT

## 2020-12-20 PROCEDURE — 84100 ASSAY OF PHOSPHORUS: CPT

## 2020-12-20 PROCEDURE — 93306 TTE W/DOPPLER COMPLETE: CPT

## 2020-12-20 PROCEDURE — 87086 URINE CULTURE/COLONY COUNT: CPT

## 2020-12-20 PROCEDURE — 85025 COMPLETE CBC W/AUTO DIFF WBC: CPT

## 2020-12-20 PROCEDURE — 82553 CREATINE MB FRACTION: CPT

## 2020-12-20 PROCEDURE — 80048 BASIC METABOLIC PNL TOTAL CA: CPT

## 2020-12-20 PROCEDURE — 36415 COLL VENOUS BLD VENIPUNCTURE: CPT

## 2020-12-20 PROCEDURE — 85730 THROMBOPLASTIN TIME PARTIAL: CPT

## 2020-12-20 PROCEDURE — 80307 DRUG TEST PRSMV CHEM ANLYZR: CPT

## 2020-12-20 PROCEDURE — 83935 ASSAY OF URINE OSMOLALITY: CPT

## 2020-12-20 PROCEDURE — 85610 PROTHROMBIN TIME: CPT

## 2020-12-20 PROCEDURE — 83735 ASSAY OF MAGNESIUM: CPT

## 2020-12-20 PROCEDURE — 74176 CT ABD & PELVIS W/O CONTRAST: CPT

## 2020-12-20 PROCEDURE — 82570 ASSAY OF URINE CREATININE: CPT

## 2020-12-20 PROCEDURE — 86038 ANTINUCLEAR ANTIBODIES: CPT

## 2020-12-20 PROCEDURE — 84300 ASSAY OF URINE SODIUM: CPT

## 2020-12-20 PROCEDURE — 84156 ASSAY OF PROTEIN URINE: CPT

## 2020-12-20 PROCEDURE — 76770 US EXAM ABDO BACK WALL COMP: CPT

## 2020-12-20 PROCEDURE — 65660000000 HC RM CCU STEPDOWN

## 2020-12-20 PROCEDURE — 86160 COMPLEMENT ANTIGEN: CPT

## 2020-12-20 PROCEDURE — 74011250636 HC RX REV CODE- 250/636: Performed by: FAMILY MEDICINE

## 2020-12-20 PROCEDURE — 81001 URINALYSIS AUTO W/SCOPE: CPT

## 2020-12-20 PROCEDURE — 85018 HEMOGLOBIN: CPT

## 2020-12-20 PROCEDURE — 83520 IMMUNOASSAY QUANT NOS NONAB: CPT

## 2020-12-20 RX ORDER — PANTOPRAZOLE SODIUM 40 MG/1
40 TABLET, DELAYED RELEASE ORAL
Status: DISCONTINUED | OUTPATIENT
Start: 2020-12-21 | End: 2020-12-30 | Stop reason: HOSPADM

## 2020-12-20 RX ORDER — SODIUM CHLORIDE 9 MG/ML
25 INJECTION, SOLUTION INTRAVENOUS CONTINUOUS
Status: DISCONTINUED | OUTPATIENT
Start: 2020-12-20 | End: 2020-12-29

## 2020-12-20 RX ADMIN — SODIUM CHLORIDE 125 ML/HR: 900 INJECTION, SOLUTION INTRAVENOUS at 06:07

## 2020-12-20 RX ADMIN — SODIUM CHLORIDE 125 ML/HR: 900 INJECTION, SOLUTION INTRAVENOUS at 14:57

## 2020-12-20 RX ADMIN — HEPARIN SODIUM 5000 UNITS: 5000 INJECTION INTRAVENOUS; SUBCUTANEOUS at 08:46

## 2020-12-20 RX ADMIN — HEPARIN SODIUM 5000 UNITS: 5000 INJECTION INTRAVENOUS; SUBCUTANEOUS at 00:46

## 2020-12-20 NOTE — PROGRESS NOTES
Reason for Admission:   Acute Renal Failure                   RUR Score:         14%            Plan for utilizing home health:      tbd    PCP: First and Last name:  none   Name of Practice:    Are you a current patient: Yes/No:    Approximate date of last visit:    Can you participate in a virtual visit with your PCP:                     Current Advanced Directive/Advance Care Plan:                          Transition of Care Plan:        Chart reviewed, met with pt at bedside. Pt admitted from home, lives with sister, uses no DME, needs PCP at discharge. Will contact [facility] to screen for resources including Medicaid to assist with hospital costs. CM will follow for needs at discharge. Care Management Interventions  PCP Verified by CM:  Yes  Transition of Care Consult (CM Consult): Discharge Planning  Current Support Network: Relative's Home  Discharge Location  Discharge Placement: Home with family assistance

## 2020-12-20 NOTE — PROGRESS NOTES
Hospitalist Progress Note-critical care note     Patient: Andrés Abraham MRN: [id number]  CSN: [id number]    YOB: 1960  Age: 56 y.o. Sex: male    DOA: 12/19/2020 LOS:  LOS: 1 day            Chief complaint: anemia , distended abdomen, hypotension elevated trop     Assessment/Plan         Hospital Problems  Never Reviewed          Codes Class Noted POA    Distended abdomen ICD-10-CM: R14.0  ICD-9-CM: 787.3  12/20/2020 Unknown        Anemia ICD-10-CM: D64.9  ICD-9-CM: 285.9  12/20/2020 Unknown        * (Principal) ARF (acute renal failure) ([facility] [address] ICD-10-CM: N17.9  ICD-9-CM: 584.9  12/19/2020 Unknown        Hypotension ICD-10-CM: I95.9  ICD-9-CM: 458.9  12/19/2020 Unknown        Elevated troponin ICD-10-CM: R77.8  ICD-9-CM: 790.6  12/19/2020 Unknown            Acute renal failure -  Worsening today   Case discussed with Dr. Leopold Borne like to have renal biopsy -work up sent   UA noted   Continue iv fluid      Hypotension improving per iv hydration   Will Continue     Hypoxia -  Resolved ? messure erro?, abg good   CXR wnl   Cannot get CTA chest because of creatinine   V/Q scan: WNL     Distended abdomen   Stat ct abdomen done: mild/moderate hemoperitoneum -case discussed with Dr. Hung King h/h monitoring      Elevated troponin -  Presenting troponin 0.19-trending down, no chest pain -like due to dilcia   Echo done today, reading still pending     Anemia   H/h monitoring   Will hold heparin for now due to possible hemoperitoneum  scd for dvt prohy     Subjective: no chest pain , not sure if  Abdomen bigger than before    Disposition :tbd,   Review of systems:    General: No fevers or chills. Cardiovascular: No chest pain or pressure. No palpitations. Pulmonary: No shortness of breath. Gastrointestinal: No nausea, vomiting.      Vital signs/Intake and Output:  Visit Vitals  /62   Pulse (!) 108   Temp 98.3 °F (36.8 °C)   Resp 20   Ht 6' (1.829 m)   Wt 108.9 kg (240 lb)   SpO2 98%   BMI 32.55 kg/m²     Current Shift:  No intake/output data recorded. Last three shifts:  12/18 1901 - 12/20 0700  In: 1000 [I.V.:1000]  Out: -     Physical Exam:  General: WD, WN. Alert, cooperative, no acute distress    HEENT: NC, Atraumatic. PERRLA, anicteric sclerae. Lungs: CTA Bilaterally. No Wheezing/Rhonchi/Rales. Heart:  Regular  rhythm,  No murmur, No Rubs, No Gallops  Abdomen: Soft, +distended, Non tender. +Bowel sounds,   Extremities: No c/c/e  Psych:   Not anxious or agitated. Neurologic:  No acute neurological deficit. Labs: Results:       Chemistry Recent Labs     12/20/20 0113 12/19/20 1915 12/18/20 2005   * 171* 225*    140 140   K 4.7 4.6 3.5    99* 100   CO2 25 27 30   BUN 51* 42* 17   CREA 6.13* 5.68* 1.73*   CA 8.2* 9.0 9.3   AGAP 11 14 10   BUCR 8* 7* 10*   AP  --  82 81   TP  --  7.6 7.0   ALB  --  3.7 3.6   GLOB  --  3.9 3.4   AGRAT  --  0.9 1.1      CBC w/Diff Recent Labs     12/20/20 0113 12/19/20 1915 12/18/20 2005   WBC 12.7 14.0* 10.8   RBC 2.92* 3.41* 3.70*   HGB 8.2* 9.5* 10.4*   HCT 24.8* 28.9* 31.8*    269 249   GRANS 76* 76* 50   LYMPH 14* 16* 41   EOS 0 0 1      Cardiac Enzymes Recent Labs     12/20/20 1237 12/20/20 0113    100   CKND1 1.9 1.1      Coagulation Recent Labs     12/20/20 1237   PTP 14.4   INR 1.1   APTT 28.6       Lipid Panel No results found for: CHOL, CHOLPOCT, CHOLX, CHLST, CHOLV, [id number], HDL, HDLP, LDL, LDLC, DLDLP, 829485, VLDLC, VLDL, TGLX, TRIGL, TRIGP, TGLPOCT, CHHD, CHHDX   BNP No results for input(s): BNPP in the last 72 hours. Liver Enzymes Recent Labs     12/19/20  1915   TP 7.6   ALB 3.7   AP 82      Thyroid Studies No results found for: T4, T3U, TSH, TSHEXT     Procedures/imaging: see electronic medical records for all procedures/Xrays and details which were not copied into this note but were reviewed prior to creation of Plan    Nm Lung Vent/perf    Result Date: 12/19/2020  History: Pulmonary embolus. Dose: 6.6 mCi 99 technetium MAA with left antecubital fossa injection site. This is a perfusion only study. There is no chest x-ray for comparison. The distribution of the radiotracer with the 8 different views post injection of the perfusion imaging material demonstrates physiologic distribution. .     IMPRESSION: Normal ventilation only lung scan    Xr Wrist Rt Ap/lat/obl Min 3v    Result Date: 12/18/2020  History: Swelling. No trauma. COMPARISON: None. 3 views of the right wrist demonstrate degenerative joint space narrowing with osteophyte formation at the first metacarpal/carpal joint. This appears moderately severe. No acute fracture. No dislocation. Bone density normal. There may be some soft tissue swelling dorsum of the hand and wrist. This is nonspecific. IMPRESSION: 1. 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 No fracture. Ct Head Wo Cont    Result Date: 12/18/2020  EXAM: CT HEAD, WITHOUT IV CONTRAST COMPARISON: None INDICATION: Possible seizure TECHNIQUE: Multiple axial CT images of the head were obtained extending from the skull base through the vertex. Additional coronal and sagittal reformations were also performed. One or more dose reduction techniques were used on this CT: automated exposure control, adjustment of the mAs and/or kVp according to patient size, and iterative reconstruction techniques. The specific techniques used on this CT exam have been documented in the patient's electronic medical record. Digital Imaging and Communications in Medicine (DICOM) format image data are available to nonaffiliated external healthcare facilities or entities on a secure, media free, reciprocally searchable basis with patient authorization for at least a 12-month period after this study. _______________ FINDINGS: BRAIN AND POSTERIOR FOSSA: The sulci, folia, ventricles and basal cisterns are within normal limits for the patient's age.  There is no intracranial hemorrhage, mass effect, or midline shift. There are no areas of abnormal parenchymal attenuation. EXTRA-AXIAL SPACES AND MENINGES: There are no abnormal extra-axial fluid collections. CALVARIUM: No acute osseous abnormality. OTHER: The visualized portions of the paranasal sinuses and mastoid air cells are clear. _______________     IMPRESSION: No CT evidence of an acute intracranial abnormality - in particular, no acute cortical infarct, hemorrhage, or mass effect. Ct Abd Pelv Wo Cont    Result Date: 12/20/2020  EXAM: CT of the abdomen and pelvis CLINICAL INDICATION/HISTORY: abdomen distended, dilcia r/o obstruction   > Additional: None. COMPARISON: None. > Reference Exam: None. TECHNIQUE: Axial CT imaging of the abdomen and pelvis was performed without intravenous contrast. Multiplanar reformats were generated. One or more dose reduction techniques were used on this CT: automated exposure control, adjustment of the mAs and/or kVp according to patient size, and iterative reconstruction techniques. The specific techniques used on this CT exam have been documented in the patient's electronic medical record. Digital Imaging and Communications in Medicine (DICOM) format image data are available to nonaffiliated external healthcare facilities or entities on a secure, media free, reciprocally searchable basis with patient authorization for at least a 12-month period after this study. _______________ FINDINGS: LOWER CHEST: Small right pleural effusion and atelectasis with respiratory motion in the lung bases. Partially visualized bilateral gynecomastia. Low-attenuation of the blood pool suggesting anemia. LIVER, BILIARY: Diffuse low-attenuation of the liver compatible with steatosis. No biliary dilation. Gallbladder is unremarkable. PANCREAS: Normal. SPLEEN: Normal. ADRENALS: Normal. KIDNEYS/URETERS/BLADDER: Bell catheter in place within the urinary bladder. Several locules of gas in keeping with recent image mentation.  Unenhanced kidneys are unremarkable. LYMPH NODES: No enlarged lymph nodes. GASTROINTESTINAL TRACT: Assessment is limited given lack of contrast. There is suggestion of abnormal wall thickening involving the duodenum. The stomach is partially distended and fluid-filled with an air-fluid level. In addition, there is liquid stool throughout the colon to the rectum, compatible with nonspecific diarrheal illness. PELVIC ORGANS: Small amount of hemoperitoneum extends into the pelvis. VASCULATURE: Unremarkable. BONES: No acute or aggressive osseous abnormalities identified. Degenerative changes are noted including bridging osteophyte formation about both SI joints. OTHER: High attenuating fluid along the inferior right hepatic lobe extending into the central mesentery is noted which measures 50-60 Hounsfield units compatible with hemoperitoneum. _______________     IMPRESSION: 1. Mild to moderate volume of hemoperitoneum of uncertain etiology tracking along the inferior right hepatic lobe and into the central mesentery with a small amount of pelvic free fluid as well. 2. Inflammatory changes in the duodenum as well as distended and partially fluid-filled stomach and liquid stool throughout the colon may represent gastroenteritis and nonspecific diarrheal illness and may be related to the above-described pneumoperitoneum. Superimposed peptic ulcer disease not excluded. No evidence of pneumatosis or free air, however. Us Retroperitoneum Comp    Result Date: 12/20/2020  EXAM: Complete retroperitoneal ultrasound INDICATION: Pain. COMPARISON: None. _______________ FINDINGS: RIGHT KIDNEY: 9.9 cm in length. No hydronephrosis. No solid renal mass. No visible calculi. Normal renal echotexture and cortical thickness. LEFT KIDNEY: 11.4 cm in length. No hydronephrosis. No solid renal mass. No visible calculi. Normal renal echotexture and cortical thickness. BLADDER: Bladder is decompressed with a Bell catheter in place.  OTHER: Prostate was not adequately visualized. _______________     IMPRESSION:  Unremarkable ultrasound of the kidneys. Bell catheter in place within the decompressed urinary bladder. Xr Chest Port    Result Date: 12/19/2020  History: Short of breath. COMPARISON: None. Portable chest x-ray performed. There is elevation of the right hemidiaphragm which is nonspecific. No focal infiltrate, pneumothorax or effusion. Heart size appears normal. Mediastinum appears normal. No skeletal abnormalities. IMPRESSION: 1. No acute pulmonary disease.       Jt Monte MD

## 2020-12-20 NOTE — CONSULTS
Nephrology Consult    Patient: Hector Navarro  Requesting physician: Dr. Valenzuela Ask  Reason for consult: Renal Failure        History of Present Illness:  Hector Navarro is a 56 y.o. male  with a past medical history significant for HTN who presented to the ED with Seizure, new onset, weakness and decreased appetite/intake. Patient noted to be Hypotensive SBP 96/53 mildly tachycardic and diaphoretic. Labs revealed a S Cr 5.6 and repeat this a.m. up to 6.1. CPK was 220 and repeat today 100. Patient had previously c/o Right wrist pains at the Urgent care center and given NSAID indocin. He stated that he only took 2 doses. Denies any other NSAID use. Denied illicit drug use. No recent Skin rash. No h/o lupus, Rheumatoid arthritis. Bell catheter this a.m. only yeilded ~ 100ml urine. CT scan of head was unremarkable. V/Q scan normal.    Past Medical History:  Patient Active Problem List   Diagnosis Code    ARF (acute renal failure) ([facility] [address] N17.9    Hypotension I95.9    Hypoxia R09.02    Elevated troponin R77.8         Social History:  Social History     Socioeconomic History    Marital status: SINGLE     Spouse name: Not on file    Number of children: Not on file    Years of education: Not on file    Highest education level: Not on file   Occupational History    Not on file   Social Needs    Financial resource strain: Not on file    Food insecurity     Worry: Not on file     Inability: Not on file    Transportation needs     Medical: Not on file     Non-medical: Not on file   Tobacco Use    Smoking status: Never Smoker    Smokeless tobacco: Never Used   Substance and Sexual Activity    Alcohol use:  Yes     Alcohol/week: 2.0 standard drinks     Types: 2 Cans of beer per week    Drug use: Not Currently    Sexual activity: Not on file   Lifestyle    Physical activity     Days per week: Not on file     Minutes per session: Not on file    Stress: Not on file   Relationships  Social connections     Talks on phone: Not on file     Gets together: Not on file     Attends Restorationist service: Not on file     Active member of club or organization: Not on file     Attends meetings of clubs or organizations: Not on file     Relationship status: Not on file    Intimate partner violence     Fear of current or ex partner: Not on file     Emotionally abused: Not on file     Physically abused: Not on file     Forced sexual activity: Not on file   Other Topics Concern    Not on file   Social History Narrative    Not on file       Family History:  History reviewed. No pertinent family history. Allergy:  No Known Allergies     Medication:  Home meds: reviewed    Inpatient meds:  Current Facility-Administered Medications   Medication Dose Route Frequency    0.9% sodium chloride infusion  125 mL/hr IntraVENous CONTINUOUS    sodium chloride (NS) flush 5-40 mL  5-40 mL IntraVENous Q8H    sodium chloride (NS) flush 5-40 mL  5-40 mL IntraVENous PRN    heparin (porcine) injection 5,000 Units  5,000 Units SubCUTAneous Q8H                        Review of Systems:  Gen: no fever or chills. + weakness  Head: no headache or trauma  Eyes: no change in vision  Throat/Neck: no sore throat or dysphagia  CV: no SOB, chest pain or palpitation  Pulm: no chest pain,  cough or hemoptysis  GI: + Decreased Appetite.  no Abd pains, nausea, vomiting or diarrhea  : no dysuria, urgency, hesitancy or hematuria  Skin: no new rash or lesions  Endocrine: no hot or cold intolerance  Neuro: + seizure x 1  Psych: No anxiety or depression    Vital signs:   Visit Vitals  BP (!) 110/46 (BP 1 Location: Right arm, BP Patient Position: At rest)   Pulse 98   Temp 98.6 °F (37 °C)   Resp 20   Ht 6' (1.829 m)   Wt 108.9 kg (240 lb)   SpO2 95%   BMI 32.55 kg/m²         Intake/Output Summary (Last 24 hours) at 12/20/2020 1039  Last data filed at 12/19/2020 2055  Gross per 24 hour   Intake 1000 ml   Output --   Net 1000 ml Physical Exam:    General: No acute distress   HENT: Atraumatic and normocephalic   Eyes: Normal conjunctiva. sclera clear   Neck: Supple No JVD or Adenopathy   Cardiovascular: Normal S1 & S2, no m/r/g   Pulmonary/Chest Wall: Clear to auscultation bilaterally   Abdominal: Soft and non-tender, BS normal   Musculoskeletal: No edema. No joint swelling   Neurological: No focal deficits     Skin: No rash    Data Review:  Recent Labs     12/20/20 0113 12/19/20 1915    140   K 4.7 4.6    99*   CO2 25 27   BUN 51* 42*   CREA 6.13* 5.68*   * 171*   PHOS 5.4* 4.9   CA 8.2* 9.0     Recent Labs     12/20/20 0113 12/19/20 1915   WBC 12.7 14.0*   HGB 8.2* 9.5*   HCT 24.8* 28.9*    269     Recent Labs     12/19/20 1915 12/18/20 2005   AP 82 81   TP 7.6 7.0   ALB 3.7 3.6   GLOB 3.9 3.4     No results for input(s): INR, PTP, APTT, INREXT in the last 72 hours. No results for input(s): IRON, FE, TIBC, IBCT, PSAT, FERR in the last 72 hours. Urinalysis  Results for Chio Kelly (MRN [id number]) as of 12/20/2020 10:40   Ref.  Range 12/18/2020 22:41   Color Latest Units:   DARK YELLOW   Appearance Latest Units:   TURBID   Specific gravity Latest Ref Range: 1.005 - 1.030   1.021   pH (UA) Latest Ref Range: 5.0 - 8.0   5.0   Protein Latest Ref Range: NEG mg/dL 300 (A)   Glucose Latest Ref Range: NEG mg/dL 250 (A)   Ketone Latest Ref Range: NEG mg/dL TRACE (A)   Blood Latest Ref Range: NEG   TRACE (A)   Bilirubin Latest Ref Range: NEG   Negative   Urobilinogen Latest Ref Range: 0.2 - 1.0 EU/dL 1.0   Nitrites Latest Ref Range: NEG   Negative   Leukocyte Esterase Latest Ref Range: NEG   Negative   Epithelial cells Latest Ref Range: 0 - 5 /lpf 1+   WBC Latest Ref Range: 0 - 5 /hpf 0 to 3   RBC Latest Ref Range: 0 - 5 /hpf 0 to 3   Bacteria Latest Ref Range: NEG /hpf 2+ (A)   Hyaline cast Latest Ref Range: 0 - 2 /lpf 20 to 50   Epithelial Cast Latest Ref Range: NEG /lpf 10 to 20 CXR:  IMPRESSION:  1. No acute pulmonary disease. Kidney ultrasound:   Pending      Impression:     1. Acute Kidney Injury, most likely ATN sec to NSAID + Intravascular volume depletion. Patient was also on ACEI, Lisinopril which may be contributory in setting of dehydration and NSAID use. Hyaline casts noted on U/A. Also Possible interstitial Nephritis. 2. Proteinuria, Poss  tubulointerstitial from ATN. Possible underlying Hypertensive Nephrosclerosis. ? Underlying glomerulonephritis. 3. Anemia  4. Hypotension  5. H/o HTN  6. Arthalgia R wrist, possible gout    Plan:     1. IV fluids NS  2. Urine Osm, Na, Cr U Eosinophil,   3. Urine Protein/Cr ratio,   4. Urine c/s  5. Renal U/S  6. Serologies: Complements C3, C4, JAVIER,  Rh factor , ANCA , Hep B and C panel,   7. SIFE, TSAT Ferritin  8. Monitor serum chemistry. Check serum Uric acid  9. Monitor I/O's  10. No NSAIDs or IV radiocontrast  11. Discussed possible need for temporary RRT with patient pending recovery of MISA. Will await response to IV fluids. Thank you for the consultation.   We will follow the patient along with the medical team.    Antonia Romano MD,  [address]  [phone]

## 2020-12-20 NOTE — ED PROVIDER NOTES
EMERGENCY DEPARTMENT HISTORY AND PHYSICAL EXAM    Date: 12/19/2020  Patient Name: Mc Sampson    History of Presenting Illness     Chief Complaint   Patient presents with    Fatigue         History Provided By: Patient    Additional History (Context):   7:20 PM  Mc Sampson is a 56 y.o. male with PMHX of arthritic problems who presents to the emergency department C/O generalized fatigue after seizure. Patient was seen here yesterday for likely new onset of seizure after taking Indocin for a suppose a diagnosis of gout. He used 2 doses of the medications within the 3-hour. And then was witnessed to have a likely seizure described as a falling out with eyes rolling back in his head drooling and shaking of his upper extremities. Paramedics found him sleepy and tired consistent with a post ictal state. He was apparently incontinent of urine after the episode but did not bite his tongue or leak any stool. There is no histories of fevers or nausea or vomiting. He denies any shortness of breath or difficulties at that time frame. He had CAT scan blood work which was mostly unremarkable except for a creatinine of 1.7. He was discharged after consultation with neurology with scheduled outpatient follow-up and EEG. He returns today with generalized fatigue no appetite taking some fluids but not eating and has not had much urine discharge all day. He denies any significant cough or difficulty breathing but sometimes feels as if he has to take a deeper breath. Otherwise he is does not have any significant pain. Social History  Does occasionally drink alcohol on rare occasions but otherwise no tobacco use or drugs    Family History  Some HTN and no renal problems    PCP: Elin, MD Sriram    Current Outpatient Medications   Medication Sig Dispense Refill    predniSONE (DELTASONE) 20 mg tablet Take 60 mg by mouth daily for 5 days.  With Breakfast 15 Tab 0       Past History     Past Medical History:  History reviewed. No pertinent past medical history. Past Surgical History:  History reviewed. No pertinent surgical history. Family History:  History reviewed. No pertinent family history. Social History:  Social History     Tobacco Use    Smoking status: Never Smoker    Smokeless tobacco: Never Used   Substance Use Topics    Alcohol use: Yes     Alcohol/week: 2.0 standard drinks     Types: 2 Cans of beer per week    Drug use: Not Currently       Allergies:  No Known Allergies      Review of Systems   Review of Systems   Constitutional: Positive for fatigue. Negative for chills and fever. Gastrointestinal: Positive for nausea. Genitourinary: Positive for decreased urine volume and difficulty urinating. Negative for dysuria. Musculoskeletal: Positive for myalgias. All other systems reviewed and are negative. Physical Exam     Vitals:    12/19/20 1923 12/19/20 1930 12/19/20 1945 12/19/20 2000   BP: 113/68 (!) 87/54 (!) 92/57 (!) 96/58   Pulse: (!) 118 (!) 114 (!) 111 (!) 103   Resp: 20 26 22 23   Temp: 98 °F (36.7 °C)      SpO2: 96% 93% 93% (!) 87%   Weight: 108.9 kg (240 lb)      Height: 6' (1.829 m)        Physical Exam  Vitals signs and nursing note reviewed. Constitutional:       General: He is not in acute distress. Appearance: He is well-developed. He is not ill-appearing, toxic-appearing or diaphoretic. Comments: Fatigued, but non toxic appearing   HENT:      Head: Normocephalic and atraumatic. Eyes:      General: No scleral icterus. Extraocular Movements:      Right eye: Normal extraocular motion. Left eye: Normal extraocular motion. Conjunctiva/sclera: Conjunctivae normal.      Pupils: Pupils are equal, round, and reactive to light. Neck:      Musculoskeletal: Normal range of motion and neck supple. Trachea: No tracheal deviation. Cardiovascular:      Rate and Rhythm: Normal rate and regular rhythm. Heart sounds: Normal heart sounds.    Pulmonary: Effort: Pulmonary effort is normal. No respiratory distress. Breath sounds: Normal breath sounds. No stridor. Abdominal:      General: Bowel sounds are normal. There is no distension. Palpations: Abdomen is soft. Tenderness: There is no abdominal tenderness. There is no rebound. Musculoskeletal: Normal range of motion. General: No tenderness. Comments: Grossly unremarkable without abnormalities   Skin:     General: Skin is warm and dry. Capillary Refill: Capillary refill takes 2 to 3 seconds. Findings: No erythema or rash. Neurological:      General: No focal deficit present. Mental Status: He is alert and oriented to person, place, and time. Cranial Nerves: No cranial nerve deficit. Motor: No weakness. Deep Tendon Reflexes: Reflexes are normal and symmetric. Psychiatric:         Thought Content: Thought content normal.         Judgment: Judgment normal.         Diagnostic Study Results     Labs -     Recent Results (from the past 12 hour(s))   CBC WITH AUTOMATED DIFF    Collection Time: 12/19/20  7:15 PM   Result Value Ref Range    WBC 14.0 (H) 4.6 - 13.2 K/uL    RBC 3.41 (L) 4.70 - 5.50 M/uL    HGB 9.5 (L) 13.0 - 16.0 g/dL    HCT 28.9 (L) 36.0 - 48.0 %    MCV 84.8 74.0 - 97.0 FL    MCH 27.9 24.0 - 34.0 PG    MCHC 32.9 31.0 - 37.0 g/dL    RDW 15.2 (H) 11.6 - 14.5 %    PLATELET 948 352 - 436 K/uL    MPV 11.0 9.2 - 11.8 FL    NEUTROPHILS 76 (H) 40 - 73 %    LYMPHOCYTES 16 (L) 21 - 52 %    MONOCYTES 8 3 - 10 %    EOSINOPHILS 0 0 - 5 %    BASOPHILS 0 0 - 2 %    ABS. NEUTROPHILS 10.7 (H) 1.8 - 8.0 K/UL    ABS. LYMPHOCYTES 2.3 0.9 - 3.6 K/UL    ABS. MONOCYTES 1.1 0.05 - 1.2 K/UL    ABS. EOSINOPHILS 0.0 0.0 - 0.4 K/UL    ABS.  BASOPHILS 0.0 0.0 - 0.1 K/UL    DF AUTOMATED     METABOLIC PANEL, BASIC    Collection Time: 12/19/20  7:15 PM   Result Value Ref Range    Sodium 140 136 - 145 mmol/L    Potassium 4.6 3.5 - 5.5 mmol/L    Chloride 99 (L) 100 - 111 mmol/L    CO2 27 21 - 32 mmol/L    Anion gap 14 3.0 - 18 mmol/L    Glucose 171 (H) 74 - 99 mg/dL    BUN 42 (H) 7.0 - 18 MG/DL    Creatinine 5.68 (H) 0.6 - 1.3 MG/DL    BUN/Creatinine ratio 7 (L) 12 - 20      GFR est AA 12 (L) >60 ml/min/1.73m2    GFR est non-AA 10 (L) >60 ml/min/1.73m2    Calcium 9.0 8.5 - 10.1 MG/DL   MAGNESIUM    Collection Time: 12/19/20  7:15 PM   Result Value Ref Range    Magnesium 2.2 1.6 - 2.6 mg/dL   PHOSPHORUS    Collection Time: 12/19/20  7:15 PM   Result Value Ref Range    Phosphorus 4.9 2.5 - 4.9 MG/DL   HEPATIC FUNCTION PANEL    Collection Time: 12/19/20  7:15 PM   Result Value Ref Range    Protein, total 7.6 6.4 - 8.2 g/dL    Albumin 3.7 3.4 - 5.0 g/dL    Globulin 3.9 2.0 - 4.0 g/dL    A-G Ratio 0.9 0.8 - 1.7      Bilirubin, total 1.0 0.2 - 1.0 MG/DL    Bilirubin, direct 0.2 0.0 - 0.2 MG/DL    Alk.  phosphatase 82 45 - 117 U/L    AST (SGOT) 69 (H) 10 - 38 U/L    ALT (SGPT) 98 (H) 16 - 61 U/L   D DIMER    Collection Time: 12/19/20  7:15 PM   Result Value Ref Range    D DIMER 3.00 (H) <0.46 ug/ml(FEU)   EKG, 12 LEAD, INITIAL    Collection Time: 12/19/20  7:28 PM   Result Value Ref Range    Ventricular Rate 114 BPM    Atrial Rate 114 BPM    P-R Interval 132 ms    QRS Duration 84 ms    Q-T Interval 332 ms    QTC Calculation (Bezet) 457 ms    Calculated P Axis 63 degrees    Calculated R Axis 77 degrees    Calculated T Axis -48 degrees    Diagnosis       Sinus tachycardia with fusion complexes  Possible Left atrial enlargement  ST & T wave abnormality, consider inferior ischemia  Abnormal ECG  When compared with ECG of 18-DEC-2020 20:02,  fusion complexes are now present  T wave inversion now evident in Inferior leads     POC G3    Collection Time: 12/19/20  8:21 PM   Result Value Ref Range    Device: NASAL CANNULA      Flow rate (POC) 3.5 L/M    FIO2 (POC) 0.38 %    pH (POC) 7.43 7.35 - 7.45      pCO2 (POC) 39.8 35.0 - 45.0 MMHG    pO2 (POC) 190 (H) 80 - 100 MMHG    HCO3 (POC) 26.6 (H) 22 - 26 MMOL/L    sO2 (POC) 100 (H) 92 - 97 %    Base excess (POC) 2 mmol/L    Allens test (POC) N/A      Total resp. rate 22      Site RIGHT RADIAL      Patient temp. 98.0      Specimen type (POC) ARTERIAL      Performed by Jere Dancer        Radiologic Studies -   NM LUNG VENT/PERF    (Results Pending)   XR CHEST PORT    (Results Pending)         CT Results  (Last 48 hours)               12/18/20 2042  CT HEAD WO CONT Final result    Impression:  IMPRESSION:       No CT evidence of an acute intracranial abnormality - in particular, no acute   cortical infarct, hemorrhage, or mass effect. Narrative:  EXAM: CT HEAD, WITHOUT IV CONTRAST       COMPARISON: None       INDICATION: Possible seizure       TECHNIQUE: Multiple axial CT images of the head were obtained extending from the   skull base through the vertex. Additional coronal and sagittal reformations were   also performed. One or more dose reduction techniques were used on this CT:   automated exposure control, adjustment of the mAs and/or kVp according to   patient size, and iterative reconstruction techniques. The specific techniques   used on this CT exam have been documented in the patient's electronic medical   record. Digital Imaging and Communications in Medicine (DICOM) format image   data are available to nonaffiliated external healthcare facilities or entities   on a secure, media free, reciprocally searchable basis with patient   authorization for at least a 12-month period after this study. _______________       FINDINGS:       BRAIN AND POSTERIOR FOSSA: The sulci, folia, ventricles and basal cisterns are   within normal limits for the patient's age. There is no intracranial hemorrhage,   mass effect, or midline shift. There are no areas of abnormal parenchymal   attenuation. EXTRA-AXIAL SPACES AND MENINGES: There are no abnormal extra-axial fluid   collections. CALVARIUM: No acute osseous abnormality.        OTHER: The visualized portions of the paranasal sinuses and mastoid air cells   are clear.        _______________               CXR Results  (Last 48 hours)    None          Medications given in the ED-  Medications   sodium chloride 0.9 % bolus infusion 1,000 mL (0 mL IntraVENous IV Completed 12/19/20 2055)         Medical Decision Making   I am the first provider for this patient. I reviewed the vital signs, available nursing notes, past medical history, past surgical history, family history and social history. Vital Signs-Reviewed the patient's vital signs. Pulse Oximetry Analysis - 93% on room air     Cardiac Monitor:  Rate: 118 bpm  Rhythm: sinus rhythm    EKG interpretation: (Preliminary)  7:35 PM   Sinus tachycardia, rate 115, nonspecific ST changes, QTC is 457  EKG read by Juanjo Bryant MD   at 7:35 PM    Records Reviewed: NURSING NOTES AND PREVIOUS MEDICAL RECORDS    Provider Notes (Medical Decision Making):   Patient is continuing to have fatigue malaise after his seizure. Almost like a very prolonged postictal state however not quite. There is no fevers or symptoms to suggest infectious process. Almost remarkably there was a significant change in her renal status after the patient had only 2 doses of Indocin. This is well-known to be potentially renal toxic and does appear to be the toxic inciting substance causing the patient's acute renal failure with likely papillary necrosis. Blood work does not look to be consistent with ATN. Additionally the patient does show evidence of significant hypoxia though there is no significant infiltrative process. He will need a evaluation and consider for pulmonary embolism by VQ scan which was negative. He did require a decent amount of supplemental oxygen whether this is from fatigue or some other type of inflammatory process within the lung. As of right now supplemental O2 is already requires to maintain good levels.   Case was discussed at length with both nephrology and hospitalist.  Antibiotics are not needed as the patient does not show any signs of sepsis. Procedures:  Procedures    ED Course:   7:20 PM: Initial assessment performed. The patients presenting problems have been discussed, and they are in agreement with the care plan formulated and outlined with them. I have encouraged them to ask questions as they arise throughout their visit. CONSULT NOTE:   9:00 PM  Raffy Veras MD   spoke with Dr Nelly Lopez  Specialty: Nephrology  Discussed pt's hx, disposition, and available diagnostic and imaging results  over the telephone. Reviewed care plans. Consulting physician agrees with plans as outlined. Agrees with current management does suggest that however if the patient's condition does not make significant improvement will consider renal biopsy, Monday. CONSULT NOTE:   10:01 PM  Raffy Veras MD   spoke with Dr. Johanny Haywood   Specialty: Hospitalist  Discussed pt's hx, disposition, and available diagnostic and imaging results  over the telephone. Reviewed care plans. Consulting physician agrees with plans as outlined. She will take over admission and care for the patient. .        Diagnosis and Disposition   CRITICAL CARE NOTE:  10:26 PM  I have spent 75 minutes of critical care time involved in lab review, consultations with specialist, family decision-making, and documentation. During this entire length of time I was immediately available to the patient. Critical Care: The reason for providing this level of medical care for this critically ill patient was due a critical illness that impaired one or more vital organ systems such that there was a high probability of imminent or life threatening deterioration in the patients condition.  This care involved high complexity decision making to assess, manipulate, and support vital system functions, to treat this degreee vital organ system failure and to prevent further life threatening deterioration of the patients condition. Core Measures:  For Hospitalized Patients:    1. Hospitalization Decision Time:  The decision to hospitalize the patient was made by Maurice Preston MD   at 9:55 PM on 12/19/2020    2. Aspirin: Aspirin was not given because the patient did not present with a stroke at the time of their Emergency Department evaluation    10:01 PM  Patient is being admitted to the hospital by Dr. Qing Starr. The results of their tests and reasons for their admission have been discussed with them and/or available family. They convey agreement and understanding for the need to be admitted and for their admission diagnosis. CONDITIONS ON ADMISSION:  Sepsis is not present at the time of admission. Deep Vein Thrombosis is not present at the time of admission. Thrombosis is not present at the time of admission. Urinary Tract Infection is not present at the time of admission. Pneumonia is not present at the time of admission. MRSA is not present at the time of admission. Wound infection is not present at the time of admission. Pressure Ulcer is not present at the time of admission. CLINICAL IMPRESSION:    1. Acute renal failure, unspecified acute renal failure type ([address]    2. Hypotension, unspecified hypotension type      _______________________________    This note was partially transcribed via voice recognition software. Although efforts have been made to catch any discrepancies, it may contain sound alike words, grammatical errors, or nonsensical words.

## 2020-12-20 NOTE — ED TRIAGE NOTES
Patient states that he was seen here last night for a seizure and states that he is weak and unable to eat

## 2020-12-20 NOTE — H&P
History & Physical    Patient: Michelle Miranda MRN: [id number]  CSN: [id number]    YOB: 1960  Age: 56 y.o. Sex: male      DOA: 12/19/2020  Primary Care Provider:  Other, MD Sriram      Assessment/Plan   Acute renal failure -  Creatinine 5.68, increased from 1.73 from yesterday   Renal labs including urine protein, urine osms, urine potassium, urine creatinine  Renal U/S  Nephrology consult: Dr. Sony Ziegler will see in AM   Continue to hydrate, check BMP in AM     Hypoxia -  Presenting 02 saturation 87%   Placed on oxygen supplementation and 02 sats improved   ABG good   D-Dimer elevated  CXR wnl   Cannot get CTA chest because of creatinine   V/Q scan: WNL     Elevated troponin -  Presenting troponin 0.19  Tachycardia   EKG: when compared with ECG of 18-DEC-2020: fusion complexes are now present,T wave inversion now evident in Inferior leads   Trend cardiac panel     Hypotension -  Initial BP 87/54  Given NS 1000ml bolus with improvement in BP, current /67. DVT prophylaxis: ordered     GI Prophylaxis: ordered    Patient Active Problem List   Diagnosis Code    ARF (acute renal failure) ([facility] [address] N17.9    Hypotension I95.9     Estimated length of stay: 2-3 weeks     CC: Fatigue       HPI:     Michelle Miranda is a 56 y.o. male who was followed at least briefly by [facility] in 2017. Was evaluated last night for acute seizure and sent home after ED physician discussion with neurologist. Daron Coffee home and felt very fatigued with very low energy, malaise and anorexia. He presented to the ED because he and his family were concerned about dehydration. In the ED, pt's also found to have low oxygen 87% which improved to high 90s on 3 liters. Also, in the ED, pt was found to be in acute renal failure with an initial creatinine of 5.68. Pt denies fever, chills, nightsweats, nausea, vomiting or loose stools. History reviewed. No pertinent surgical history.     Family history:   Father: Diabetes Mother: Cancer, Diabetes and Hypertension    Social History     Socioeconomic History    Marital status: SINGLE     Spouse name: Not on file    Number of children: Not on file    Years of education: Not on file    Highest education level: Not on file   Tobacco Use    Smoking status: Never Smoker    Smokeless tobacco: Never Used   Substance and Sexual Activity    Alcohol use: Yes     Alcohol/week: 2.0 standard drinks     Types: 2 Cans of beer per week    Drug use: Not Currently       Prior to Admission medications    Medication Sig Start Date End Date Taking? Authorizing Provider   predniSONE (DELTASONE) 20 mg tablet Take 60 mg by mouth daily for 5 days. With Breakfast 20  Benjy Infante MD       No Known Allergies    Review of Systems  Gen: No fever, chills, malaise, weight loss/gain. Heent: No headache, rhinorrhea, epistaxis, ear pain, hearing loss, sinus pain, neck pain/stiffness, sore throat. Heart: No chest pain, palpitations, FORTE, pnd, or orthopnea. Resp: No cough, hemoptysis, wheezing and shortness of breath. GI: No nausea, vomiting, diarrhea, constipation, melena or hematochezia. : No urinary obstruction, dysuria or hematuria. Derm: No rash, new skin lesion or pruritis. Musc/skeletal: no bone or joint complains. Vasc: No edema, cyanosis or claudication. Endo: No heat/cold intolerance, no polyuria,polydipsia or polyphagia. Neuro: No unilateral weakness, numbness, tingling. No seizures. Heme: No easy bruising or bleeding. Physical Exam:     Physical Exam:  Visit Vitals  BP (!) 96/58   Pulse (!) 103   Temp 98 °F (36.7 °C)   Resp 23   Ht 6' (1.829 m)   Wt 108.9 kg (240 lb)   SpO2 (!) 87%   BMI 32.55 kg/m²      O2 Device: Room air    Temp (24hrs), Av °F (36.7 °C), Min:98 °F (36.7 °C), Max:98 °F (36.7 °C)     1901 -  0700  In: 1000 [I.V.:1000]  Out: -    No intake/output data recorded. General:  Awake, cooperative, no distress.    Head: Normocephalic, without obvious abnormality, atraumatic. Eyes:  Conjunctivae/corneas clear, sclera anicteric, PERRL, EOMs intact. Nose: Nares normal. No drainage or sinus tenderness. Throat: Lips, mucosa, and tongue normal.    Neck: Supple, symmetrical, trachea midline, no adenopathy. Lungs:   Clear to auscultation bilaterally. Heart:  Regular rate and rhythm, S1, S2 normal, no murmur, click, rub or gallop. Abdomen: Soft, non-tender. Bowel sounds normal. No masses,  No organomegaly. Extremities: Extremities normal, atraumatic, no cyanosis or edema. Capillary refill normal.   Pulses: 2+ and symmetric all extremities. Skin: Skin color as per ethnicity, turgor normal. No rashes or lesions   Neurologic: CNII-XII intact. No focal motor or sensory deficit.        Labs Reviewed:  Recent Results (from the past 24 hour(s))   URINALYSIS W/ RFLX MICROSCOPIC    Collection Time: 12/18/20 10:41 PM   Result Value Ref Range    Color DARK YELLOW      Appearance TURBID      Specific gravity 1.021 1.005 - 1.030      pH (UA) 5.0 5.0 - 8.0      Protein 300 (A) NEG mg/dL    Glucose 250 (A) NEG mg/dL    Ketone TRACE (A) NEG mg/dL    Bilirubin Negative NEG      Blood TRACE (A) NEG      Urobilinogen 1.0 0.2 - 1.0 EU/dL    Nitrites Negative NEG      Leukocyte Esterase Negative NEG     URINE MICROSCOPIC ONLY    Collection Time: 12/18/20 10:41 PM   Result Value Ref Range    WBC 0 to 3 0 - 5 /hpf    RBC 0 to 3 0 - 5 /hpf    Epithelial cells 1+ 0 - 5 /lpf    Bacteria 2+ (A) NEG /hpf    Hyaline cast 20 to 50 0 - 2 /lpf    Epithelial Cast 10 to 20 NEG /lpf   CBC WITH AUTOMATED DIFF    Collection Time: 12/19/20  7:15 PM   Result Value Ref Range    WBC 14.0 (H) 4.6 - 13.2 K/uL    RBC 3.41 (L) 4.70 - 5.50 M/uL    HGB 9.5 (L) 13.0 - 16.0 g/dL    HCT 28.9 (L) 36.0 - 48.0 %    MCV 84.8 74.0 - 97.0 FL    MCH 27.9 24.0 - 34.0 PG    MCHC 32.9 31.0 - 37.0 g/dL    RDW 15.2 (H) 11.6 - 14.5 %    PLATELET 756 281 - 309 K/uL    MPV 11.0 9.2 - 11.8 FL    NEUTROPHILS 76 (H) 40 - 73 %    LYMPHOCYTES 16 (L) 21 - 52 %    MONOCYTES 8 3 - 10 %    EOSINOPHILS 0 0 - 5 %    BASOPHILS 0 0 - 2 %    ABS. NEUTROPHILS 10.7 (H) 1.8 - 8.0 K/UL    ABS. LYMPHOCYTES 2.3 0.9 - 3.6 K/UL    ABS. MONOCYTES 1.1 0.05 - 1.2 K/UL    ABS. EOSINOPHILS 0.0 0.0 - 0.4 K/UL    ABS. BASOPHILS 0.0 0.0 - 0.1 K/UL    DF AUTOMATED     METABOLIC PANEL, BASIC    Collection Time: 12/19/20  7:15 PM   Result Value Ref Range    Sodium 140 136 - 145 mmol/L    Potassium 4.6 3.5 - 5.5 mmol/L    Chloride 99 (L) 100 - 111 mmol/L    CO2 27 21 - 32 mmol/L    Anion gap 14 3.0 - 18 mmol/L    Glucose 171 (H) 74 - 99 mg/dL    BUN 42 (H) 7.0 - 18 MG/DL    Creatinine 5.68 (H) 0.6 - 1.3 MG/DL    BUN/Creatinine ratio 7 (L) 12 - 20      GFR est AA 12 (L) >60 ml/min/1.73m2    GFR est non-AA 10 (L) >60 ml/min/1.73m2    Calcium 9.0 8.5 - 10.1 MG/DL   MAGNESIUM    Collection Time: 12/19/20  7:15 PM   Result Value Ref Range    Magnesium 2.2 1.6 - 2.6 mg/dL   PHOSPHORUS    Collection Time: 12/19/20  7:15 PM   Result Value Ref Range    Phosphorus 4.9 2.5 - 4.9 MG/DL   HEPATIC FUNCTION PANEL    Collection Time: 12/19/20  7:15 PM   Result Value Ref Range    Protein, total 7.6 6.4 - 8.2 g/dL    Albumin 3.7 3.4 - 5.0 g/dL    Globulin 3.9 2.0 - 4.0 g/dL    A-G Ratio 0.9 0.8 - 1.7      Bilirubin, total 1.0 0.2 - 1.0 MG/DL    Bilirubin, direct 0.2 0.0 - 0.2 MG/DL    Alk.  phosphatase 82 45 - 117 U/L    AST (SGOT) 69 (H) 10 - 38 U/L    ALT (SGPT) 98 (H) 16 - 61 U/L   D DIMER    Collection Time: 12/19/20  7:15 PM   Result Value Ref Range    D DIMER 3.00 (H) <0.46 ug/ml(FEU)   CARDIAC PANEL,(CK, CKMB & TROPONIN)    Collection Time: 12/19/20  7:15 PM   Result Value Ref Range    CK - MB 1.1 <3.6 ng/ml    CK-MB Index 0.8 0.0 - 4.0 %     39 - 308 U/L    Troponin-I, QT 0.19 (H) 0.0 - 0.045 NG/ML   EKG, 12 LEAD, INITIAL    Collection Time: 12/19/20  7:28 PM   Result Value Ref Range    Ventricular Rate 114 BPM    Atrial Rate 114 BPM    P-R Interval 132 ms    QRS Duration 84 ms    Q-T Interval 332 ms    QTC Calculation (Bezet) 457 ms    Calculated P Axis 63 degrees    Calculated R Axis 77 degrees    Calculated T Axis -48 degrees    Diagnosis       Sinus tachycardia with fusion complexes  Possible Left atrial enlargement  ST & T wave abnormality, consider inferior ischemia  Abnormal ECG  When compared with ECG of 18-DEC-2020 20:02,  fusion complexes are now present  T wave inversion now evident in Inferior leads     POC G3    Collection Time: 12/19/20  8:21 PM   Result Value Ref Range    Device: NASAL CANNULA      Flow rate (POC) 3.5 L/M    FIO2 (POC) 0.38 %    pH (POC) 7.43 7.35 - 7.45      pCO2 (POC) 39.8 35.0 - 45.0 MMHG    pO2 (POC) 190 (H) 80 - 100 MMHG    HCO3 (POC) 26.6 (H) 22 - 26 MMOL/L    sO2 (POC) 100 (H) 92 - 97 %    Base excess (POC) 2 mmol/L    Allens test (POC) N/A      Total resp. rate 22      Site RIGHT RADIAL      Patient temp.  98.0      Specimen type (POC) ARTERIAL      Performed by Genora [employer]        Procedures/imaging: see electronic medical records for all procedures/Xrays and details which were not copied into this note but were reviewed prior to creation of Plan      CC: Elin, MD Sriram

## 2020-12-20 NOTE — ROUTINE PROCESS
Bedside and Verbal shift change report given to Stas Shelton RN  (oncoming nurse) by Handy Weston RN  (offgoing nurse). Report given with SBAR, Kardex, Intake/Output and Recent Results.

## 2020-12-20 NOTE — PROGRESS NOTES
3458 The patient arrived via stretcher from the ED to 343. The patient is alert, oriented and denies distress of any kind. Oriented him to room, call bell and unit routines. Tele box #13 on reading ST with a rate in the 100's. VSS.     [id number] Shift Summary: Pt rested well overnight with no complaints. No new clinical concerns noted.      Nightshift Chart Audit Completed

## 2020-12-21 ENCOUNTER — APPOINTMENT (OUTPATIENT)
Dept: CT IMAGING | Age: 60
DRG: 682 | End: 2020-12-21
Attending: HOSPITALIST

## 2020-12-21 LAB
ALBUMIN SERPL-MCNC: 2.8 G/DL (ref 3.4–5)
ANION GAP SERPL CALC-SCNC: 8 MMOL/L (ref 3–18)
BACTERIA SPEC CULT: NORMAL
BASOPHILS # BLD: 0 K/UL (ref 0–0.1)
BASOPHILS NFR BLD: 0 % (ref 0–2)
BUN SERPL-MCNC: 65 MG/DL (ref 7–18)
BUN/CREAT SERPL: 16 (ref 12–20)
CALCIUM SERPL-MCNC: 7.4 MG/DL (ref 8.5–10.1)
CALCIUM SERPL-MCNC: 7.7 MG/DL (ref 8.5–10.1)
CHLORIDE SERPL-SCNC: 108 MMOL/L (ref 100–111)
CO2 SERPL-SCNC: 24 MMOL/L (ref 21–32)
CREAT SERPL-MCNC: 3.95 MG/DL (ref 0.6–1.3)
DIFFERENTIAL METHOD BLD: ABNORMAL
EOSINOPHIL # BLD: 0 K/UL (ref 0–0.4)
EOSINOPHIL NFR BLD: 0 % (ref 0–5)
ERYTHROCYTE [DISTWIDTH] IN BLOOD BY AUTOMATED COUNT: 15.5 % (ref 11.6–14.5)
FERRITIN SERPL-MCNC: 1187 NG/ML (ref 8–388)
GLUCOSE SERPL-MCNC: 115 MG/DL (ref 74–99)
HCT VFR BLD AUTO: 20.4 % (ref 36–48)
HCT VFR BLD AUTO: 20.8 % (ref 36–48)
HGB BLD-MCNC: 6.8 G/DL (ref 13–16)
HGB BLD-MCNC: 6.9 G/DL (ref 13–16)
HISTORY CHECKED?,CKHIST: NORMAL
IRON SATN MFR SERPL: 8 % (ref 20–50)
IRON SERPL-MCNC: 14 UG/DL (ref 50–175)
LYMPHOCYTES # BLD: 1.4 K/UL (ref 0.9–3.6)
LYMPHOCYTES NFR BLD: 12 % (ref 21–52)
MAGNESIUM SERPL-MCNC: 2.3 MG/DL (ref 1.6–2.6)
MCH RBC QN AUTO: 28.5 PG (ref 24–34)
MCHC RBC AUTO-ENTMCNC: 33.2 G/DL (ref 31–37)
MCV RBC AUTO: 86 FL (ref 74–97)
MONOCYTES # BLD: 1.1 K/UL (ref 0.05–1.2)
MONOCYTES NFR BLD: 9 % (ref 3–10)
NEUTS SEG # BLD: 9.1 K/UL (ref 1.8–8)
NEUTS SEG NFR BLD: 79 % (ref 40–73)
PHOSPHATE SERPL-MCNC: 3.5 MG/DL (ref 2.5–4.9)
PLATELET # BLD AUTO: 185 K/UL (ref 135–420)
PMV BLD AUTO: 10 FL (ref 9.2–11.8)
POTASSIUM SERPL-SCNC: 4.7 MMOL/L (ref 3.5–5.5)
PTH-INTACT SERPL-MCNC: 224.2 PG/ML (ref 18.4–88)
RBC # BLD AUTO: 2.42 M/UL (ref 4.7–5.5)
SERVICE CMNT-IMP: NORMAL
SODIUM SERPL-SCNC: 140 MMOL/L (ref 136–145)
TIBC SERPL-MCNC: 173 UG/DL (ref 250–450)
URATE SERPL-MCNC: 10.4 MG/DL (ref 2.6–7.2)
WBC # BLD AUTO: 11.6 K/UL (ref 4.6–13.2)

## 2020-12-21 PROCEDURE — 36415 COLL VENOUS BLD VENIPUNCTURE: CPT

## 2020-12-21 PROCEDURE — 86704 HEP B CORE ANTIBODY TOTAL: CPT

## 2020-12-21 PROCEDURE — 84550 ASSAY OF BLOOD/URIC ACID: CPT

## 2020-12-21 PROCEDURE — 87340 HEPATITIS B SURFACE AG IA: CPT

## 2020-12-21 PROCEDURE — 86901 BLOOD TYPING SEROLOGIC RH(D): CPT

## 2020-12-21 PROCEDURE — 74011250637 HC RX REV CODE- 250/637: Performed by: HOSPITALIST

## 2020-12-21 PROCEDURE — 86706 HEP B SURFACE ANTIBODY: CPT

## 2020-12-21 PROCEDURE — 82784 ASSAY IGA/IGD/IGG/IGM EACH: CPT

## 2020-12-21 PROCEDURE — 83970 ASSAY OF PARATHORMONE: CPT

## 2020-12-21 PROCEDURE — 85025 COMPLETE CBC W/AUTO DIFF WBC: CPT

## 2020-12-21 PROCEDURE — 74176 CT ABD & PELVIS W/O CONTRAST: CPT

## 2020-12-21 PROCEDURE — 86923 COMPATIBILITY TEST ELECTRIC: CPT

## 2020-12-21 PROCEDURE — 82728 ASSAY OF FERRITIN: CPT

## 2020-12-21 PROCEDURE — 83735 ASSAY OF MAGNESIUM: CPT

## 2020-12-21 PROCEDURE — 36430 TRANSFUSION BLD/BLD COMPNT: CPT

## 2020-12-21 PROCEDURE — 80069 RENAL FUNCTION PANEL: CPT

## 2020-12-21 PROCEDURE — 86803 HEPATITIS C AB TEST: CPT

## 2020-12-21 PROCEDURE — 85018 HEMOGLOBIN: CPT

## 2020-12-21 PROCEDURE — 83540 ASSAY OF IRON: CPT

## 2020-12-21 PROCEDURE — 65660000000 HC RM CCU STEPDOWN

## 2020-12-21 PROCEDURE — P9016 RBC LEUKOCYTES REDUCED: HCPCS

## 2020-12-21 RX ORDER — SODIUM CHLORIDE 9 MG/ML
250 INJECTION, SOLUTION INTRAVENOUS AS NEEDED
Status: DISCONTINUED | OUTPATIENT
Start: 2020-12-21 | End: 2020-12-30 | Stop reason: HOSPADM

## 2020-12-21 RX ADMIN — Medication 10 ML: at 08:19

## 2020-12-21 RX ADMIN — PANTOPRAZOLE SODIUM 40 MG: 40 TABLET, DELAYED RELEASE ORAL at 06:32

## 2020-12-21 NOTE — PROGRESS NOTES
Discussed case at length with Dr. Tomer Wagner.  Reviewed pt chart, labs and images. Unclear etiology of pt ARF and anemia. CT appears to have fluid concerning for hemoperitoneum however no IV or oral contrast was used. Pt not complaining of abd pain. Tachy but otherwise stable. Plan with Dr. Tomer Wagner is to stop heparin, trend H/H, and watch clinically. If any further changes recommend repeat CT with oral and IV contrast as pt is already in failure and would determine whether surgical intervention is needed and in what capacity. (45 minutes spent in evaluation, analysis and discussion with hospitalist).     Arcelia Rodríguez DO  12/20/2020

## 2020-12-21 NOTE — PROGRESS NOTES
Nephrology Progress note    Subjective:     Josie Arnold is a 56 y.o. male with past medical history significant for HTN who presented to the ED with Seizure, new onset, weakness and decreased appetite/intake. Patient noted to be Hypotensive SBP 96/53 mildly tachycardic and diaphoretic. Labs revealed a S Cr 5.6 and repeat this a.m. up to 6.1. CPK was 220 and repeat today 100. Patient had previously c/o Right wrist pains at the Urgent care center and given NSAID indocin. He stated that he only took 2 doses. Denies any other NSAID use. Denied illicit drug use. No recent Skin rash. No h/o lupus, Rheumatoid arthritis. Bell catheter this a.m. only yeilded ~ 100ml urine. CT scan of head was unremarkable. V/Q scan normal. Cr 6.1=>3.9 today. Denies abd pain, Hg dropped 8=>6.9    CT abd-1. Mild to moderate volume of hemoperitoneum of uncertain etiology tracking  along the inferior right hepatic lobe and into the central mesentery with a  small amount of pelvic free fluid as well. Unremarkable kidneys  2. Inflammatory changes in the duodenum as well as distended and partially  fluid-filled stomach and liquid stool throughout the colon may represent  gastroenteritis and nonspecific diarrheal illness and may be related to the  above-described pneumoperitoneum. Superimposed peptic ulcer disease not  excluded. No evidence of pneumatosis or free air, however.       Admit Date: 12/19/2020      Allergy:  Allergies   Allergen Reactions    Influenza Virus Vaccines Anaphylaxis        Objective:     Visit Vitals  /70   Pulse 100   Temp 99.1 °F (37.3 °C)   Resp 16   Ht 6' (1.829 m)   Wt 108.9 kg (240 lb)   SpO2 99%   BMI 32.55 kg/m²         Intake/Output Summary (Last 24 hours) at 12/21/2020 1023  Last data filed at 12/21/2020 2466  Gross per 24 hour   Intake 1830.42 ml   Output 1775 ml   Net 55.42 ml       Physical Exam:       General: No acute distress   HENT: Atraumatic and normocephalic   Eyes: Normal conjunctiva Neck: Supple    Cardiovascular: Normal S1 & S2, no m/r/g   Pulmonary/Chest Wall: Clear to auscultation bilaterally   Abdominal: Soft and non-tender   Musculoskeletal: no edema   Neurological: No focal deficits       Data Review:    Lab Results   Component Value Date/Time    WBC 11.6 12/21/2020 05:50 AM    RBC 2.42 (L) 12/21/2020 05:50 AM    HCT 20.8 (L) 12/21/2020 05:50 AM    MCV 86.0 12/21/2020 05:50 AM    MCH 28.5 12/21/2020 05:50 AM    MCHC 33.2 12/21/2020 05:50 AM    RDW 15.5 (H) 12/21/2020 05:50 AM      No results found for: IRONNo components found for: FERRITIN  No components found for: PTHINT  Urinalysis  No results found for: UGLU          Impression:     1. Acute Kidney Injury, most likely ATN sec to NSAID + Intravascular volume depletion. Patient was also on ACEI, Lisinopril which may be contributory in setting of dehydration and NSAID use. Hyaline casts noted on U/A. Also Possible interstitial Nephritis. Poppy 37  2. Proteinuria, Poss  tubulointerstitial from ATN. Possible underlying Hypertensive Nephrosclerosis. ? Underlying glomerulonephritis. 3. Anemia  4. Hypotension  5. H/o HTN  6. Arthalgia R wrist, possible gout  7. Anemia- r/o GIB  Plan:     8. IV fluids NS  9. Serologies: Complements C3, C4, JAVIER,  Rh factor , ANCA , Hep B and C panel,   10. SIFE, TSAT Ferritin  11. Monitor serum chemistry. 12. Monitor I/O's  13. No NSAIDs or IV radiocontrast  14. No urgent need for RRT  15.  Surgery following, if CT repeated, would avoid IV contrast,  MD Tristan Leal  [phone]

## 2020-12-21 NOTE — PROGRESS NOTES
Problem: Falls - Risk of  Goal: *Absence of Falls  Description: Document Lb Baltazar Fall Risk and appropriate interventions in the flowsheet.   Outcome: Progressing Towards Goal  Note: Fall Risk Interventions:  Mobility Interventions: Patient to call before getting OOB         Medication Interventions: Teach patient to arise slowly                   Problem: Patient Education: Go to Patient Education Activity  Goal: Patient/Family Education  Outcome: Progressing Towards Goal

## 2020-12-21 NOTE — PROGRESS NOTES
[id number] Shift Summary: Pt rested well overnight with no complaints. No new clinical concerns noted. He has been NPO since MN for a possible kidney biopsy.     Nightshift Chart Audit Completed

## 2020-12-21 NOTE — ROUTINE PROCESS
Bedside and Verbal shift change report given to [address]  (oncoming nurse) by Rashid Loya RN  (offgoing nurse). Report given with SBAR, Kardex, Intake/Output and Recent Results.

## 2020-12-21 NOTE — PROGRESS NOTES
Hospitalist Progress Note-critical care note     Patient: Laine Dove MRN: [id number]  CSN: [id number]    YOB: 1960  Age: 56 y.o. Sex: male    DOA: 12/19/2020 LOS:  LOS: 2 days            Chief complaint: anemia , distended abdomen, hypotension elevated trop     Assessment/Plan         Hospital Problems  Never Reviewed          Codes Class Noted POA    Distended abdomen ICD-10-CM: R14.0  ICD-9-CM: 787.3  12/20/2020 Unknown        Anemia ICD-10-CM: D64.9  ICD-9-CM: 285.9  12/20/2020 Unknown        * (Principal) ARF (acute renal failure) ([facility] [address] ICD-10-CM: N17.9  ICD-9-CM: 584.9  12/19/2020 Unknown        Hypotension ICD-10-CM: I95.9  ICD-9-CM: 458.9  12/19/2020 Unknown        Elevated troponin ICD-10-CM: R77.8  ICD-9-CM: 790.6  12/19/2020 Unknown            Acute renal failure -  Improving today per iv hydration   Continue iv fluid   Renal is working up for the etiology      Hypotension improving per iv hydration   Resolved     Hypoxia -  Resolved  CXR wnl   Cannot get CTA chest because of creatinine   V/Q scan: WNL     Distended abdomen   Stat ct abdomen done: mild/moderate hemoperitoneum -case discussed with Dr. Shahid Prado -recommend iv contrast  Case discussed with radiologist and recommend to repeat ct abdomen to see any change, otherwise need cta abdomen for bleeding site. Continue h/h monitoring      Elevated troponin -  Presenting troponin 0.19-trending down, no chest pain -  Echo: ef wnl and and The left ventricular wall motion is normal.        Anemia   Will give blood transfusion    hemoperitoneum : so far not sure where the bleeding come from   Will repeat ct without contrast for now, have to do cta abdomen/pelvic if pt agree  Subjective: no chest pain , no abdomen pain     Case discussed with radiologist about the imaging, case discussed dr. Shahid Prado and dr. Damien Wilburn.  Discussed with pt about the benefit of possible finding the bleeding site per cta -which will instruct us to stop bleeding, but the risk of end of hemodialysis. He would like to discuss with his sister and he is ok to let his sister know it. Called [phone]. Talked with monica damian- his sister above, she will call his brother and make decision      All questions have been answered. 55 total min's spent on patient care including >50% on counseling/coordinating care. Discussed the above assessments. also discussed labs, medications and hospital course      Disposition :tbd,   Review of systems:    General: No fevers or chills. Cardiovascular: No chest pain or pressure. No palpitations. Pulmonary: No shortness of breath. Gastrointestinal: No nausea, vomiting. Vital signs/Intake and Output:  Visit Vitals  BP (!) 142/70   Pulse 100   Temp 99.1 °F (37.3 °C)   Resp 16   Ht 6' (1.829 m)   Wt 108.9 kg (240 lb)   SpO2 100%   BMI 32.55 kg/m²     Current Shift:  12/21 0701 - 12/21 1900  In: -   Out: 4546 [NTIRL:4584]  Last three shifts:  12/19 1901 - 12/21 0700  In: 2830.4 [P.O.:220; I.V.:2610.4]  Out: 9867 [Urine:1775]    Physical Exam:  General: WD, WN. Alert, cooperative, no acute distress    HEENT: NC, Atraumatic. PERRLA, anicteric sclerae. Lungs: CTA Bilaterally. No Wheezing/Rhonchi/Rales. Heart:  Regular  rhythm,  No murmur, No Rubs, No Gallops  Abdomen: Soft, +distended, Non tender. +Bowel sounds,   Extremities: No c/c/e  Psych:   Not anxious or agitated. Neurologic:  No acute neurological deficit.              Labs: Results:       Chemistry Recent Labs     12/21/20  0550 12/20/20  0113 12/19/20  1915 12/18/20 2005   * 147* 171* 225*    138 140 140   K 4.7 4.7 4.6 3.5    102 99* 100   CO2 24 25 27 30   BUN 65* 51* 42* 17   CREA 3.95* 6.13* 5.68* 1.73*   CA 7.7*  7.4* 8.2* 9.0 9.3   AGAP 8 11 14 10   BUCR 16 8* 7* 10*   AP  --   --  82 81   TP  --   --  7.6 7.0   ALB 2.8*  --  3.7 3.6   GLOB  --   --  3.9 3.4   AGRAT  --   --  0.9 1.1      CBC w/Diff Recent Labs     12/21/20  0955 12/21/20  0550 12/20/20  1620 12/20/20  0113 12/19/20 1915   WBC  --  11.6  --  12.7 14.0*   RBC  --  2.42*  --  2.92* 3.41*   HGB 6.8* 6.9* 8.0* 8.2* 9.5*   HCT 20.4* 20.8* 24.3* 24.8* 28.9*   PLT  --  185  --  208 269   GRANS  --  79*  --  76* 76*   LYMPH  --  12*  --  14* 16*   EOS  --  0  --  0 0      Cardiac Enzymes Recent Labs     12/20/20  1237 12/20/20  0113    100   CKND1 1.9 1.1      Coagulation Recent Labs     12/20/20  1237   PTP 14.4   INR 1.1   APTT 28.6       Lipid Panel No results found for: CHOL, CHOLPOCT, CHOLX, CHLST, CHOLV, 324113, HDL, HDLP, LDL, LDLC, DLDLP, 531864, VLDLC, VLDL, TGLX, TRIGL, TRIGP, TGLPOCT, CHHD, CHHDX   BNP No results for input(s): BNPP in the last 72 hours. Liver Enzymes Recent Labs     12/21/20  0550 12/19/20 1915   TP  --  7.6   ALB 2.8* 3.7   AP  --  82      Thyroid Studies No results found for: T4, T3U, TSH, TSHEXT, TSHEXT     Procedures/imaging: see electronic medical records for all procedures/Xrays and details which were not copied into this note but were reviewed prior to creation of Plan    Nm Lung Vent/perf    Result Date: 12/19/2020  History: Pulmonary embolus. Dose: 6.6 mCi 99 technetium MAA with left antecubital fossa injection site. This is a perfusion only study. There is no chest x-ray for comparison. The distribution of the radiotracer with the 8 different views post injection of the perfusion imaging material demonstrates physiologic distribution. .     IMPRESSION: Normal ventilation only lung scan    Xr Wrist Rt Ap/lat/obl Min 3v    Result Date: 12/18/2020  History: Swelling. No trauma. COMPARISON: None. 3 views of the right wrist demonstrate degenerative joint space narrowing with osteophyte formation at the first metacarpal/carpal joint. This appears moderately severe. No acute fracture. No dislocation. Bone density normal. There may be some soft tissue swelling dorsum of the hand and wrist. This is nonspecific. IMPRESSION: 1.  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 No fracture. Ct Head Wo Cont    Result Date: 12/18/2020  EXAM: CT HEAD, WITHOUT IV CONTRAST COMPARISON: None INDICATION: Possible seizure TECHNIQUE: Multiple axial CT images of the head were obtained extending from the skull base through the vertex. Additional coronal and sagittal reformations were also performed. One or more dose reduction techniques were used on this CT: automated exposure control, adjustment of the mAs and/or kVp according to patient size, and iterative reconstruction techniques. The specific techniques used on this CT exam have been documented in the patient's electronic medical record. Digital Imaging and Communications in Medicine (DICOM) format image data are available to nonaffiliated external healthcare facilities or entities on a secure, media free, reciprocally searchable basis with patient authorization for at least a 12-month period after this study. _______________ FINDINGS: BRAIN AND POSTERIOR FOSSA: The sulci, folia, ventricles and basal cisterns are within normal limits for the patient's age. There is no intracranial hemorrhage, mass effect, or midline shift. There are no areas of abnormal parenchymal attenuation. EXTRA-AXIAL SPACES AND MENINGES: There are no abnormal extra-axial fluid collections. CALVARIUM: No acute osseous abnormality. OTHER: The visualized portions of the paranasal sinuses and mastoid air cells are clear. _______________     IMPRESSION: No CT evidence of an acute intracranial abnormality - in particular, no acute cortical infarct, hemorrhage, or mass effect. Ct Abd Pelv Wo Cont    Result Date: 12/20/2020  EXAM: CT of the abdomen and pelvis CLINICAL INDICATION/HISTORY: abdomen distended, dilcia r/o obstruction   > Additional: None. COMPARISON: None. > Reference Exam: None. TECHNIQUE: Axial CT imaging of the abdomen and pelvis was performed without intravenous contrast. Multiplanar reformats were generated.  One or more dose reduction techniques were used on this CT: automated exposure control, adjustment of the mAs and/or kVp according to patient size, and iterative reconstruction techniques. The specific techniques used on this CT exam have been documented in the patient's electronic medical record. Digital Imaging and Communications in Medicine (DICOM) format image data are available to nonaffiliated external healthcare facilities or entities on a secure, media free, reciprocally searchable basis with patient authorization for at least a 12-month period after this study. _______________ FINDINGS: LOWER CHEST: Small right pleural effusion and atelectasis with respiratory motion in the lung bases. Partially visualized bilateral gynecomastia. Low-attenuation of the blood pool suggesting anemia. LIVER, BILIARY: Diffuse low-attenuation of the liver compatible with steatosis. No biliary dilation. Gallbladder is unremarkable. PANCREAS: Normal. SPLEEN: Normal. ADRENALS: Normal. KIDNEYS/URETERS/BLADDER: Bell catheter in place within the urinary bladder. Several locules of gas in keeping with recent image mentation. Unenhanced kidneys are unremarkable. LYMPH NODES: No enlarged lymph nodes. GASTROINTESTINAL TRACT: Assessment is limited given lack of contrast. There is suggestion of abnormal wall thickening involving the duodenum. The stomach is partially distended and fluid-filled with an air-fluid level. In addition, there is liquid stool throughout the colon to the rectum, compatible with nonspecific diarrheal illness. PELVIC ORGANS: Small amount of hemoperitoneum extends into the pelvis. VASCULATURE: Unremarkable. BONES: No acute or aggressive osseous abnormalities identified. Degenerative changes are noted including bridging osteophyte formation about both SI joints.  OTHER: High attenuating fluid along the inferior right hepatic lobe extending into the central mesentery is noted which measures 50-60 Hounsfield units compatible with hemoperitoneum. _______________     IMPRESSION: 1. Mild to moderate volume of hemoperitoneum of uncertain etiology tracking along the inferior right hepatic lobe and into the central mesentery with a small amount of pelvic free fluid as well. 2. Inflammatory changes in the duodenum as well as distended and partially fluid-filled stomach and liquid stool throughout the colon may represent gastroenteritis and nonspecific diarrheal illness and may be related to the above-described pneumoperitoneum. Superimposed peptic ulcer disease not excluded. No evidence of pneumatosis or free air, however. Us Retroperitoneum Comp    Result Date: 12/20/2020  EXAM: Complete retroperitoneal ultrasound INDICATION: Pain. COMPARISON: None. _______________ FINDINGS: RIGHT KIDNEY: 9.9 cm in length. No hydronephrosis. No solid renal mass. No visible calculi. Normal renal echotexture and cortical thickness. LEFT KIDNEY: 11.4 cm in length. No hydronephrosis. No solid renal mass. No visible calculi. Normal renal echotexture and cortical thickness. BLADDER: Bladder is decompressed with a Bell catheter in place. OTHER: Prostate was not adequately visualized. _______________     IMPRESSION:  Unremarkable ultrasound of the kidneys. Bell catheter in place within the decompressed urinary bladder. Xr Chest Port    Result Date: 12/19/2020  History: Short of breath. COMPARISON: None. Portable chest x-ray performed. There is elevation of the right hemidiaphragm which is nonspecific. No focal infiltrate, pneumothorax or effusion. Heart size appears normal. Mediastinum appears normal. No skeletal abnormalities. IMPRESSION: 1. No acute pulmonary disease.       Jt Monte MD

## 2020-12-21 NOTE — ROUTINE PROCESS
Bedside and Verbal shift change report given to Jose Nieves (oncoming nurse) by Roselyn Bridges RN (offgoing nurse). Report included the following information SBAR and Kardex.

## 2020-12-21 NOTE — PROGRESS NOTES
Ct Moderate degree of hemoperitoneum, greatest in the upper abdomen, with  overall volume similar, shifting of some of the hemoperitoneum from the upper  abdomen to the pelvis. 2. Small degree of retroperitoneal hematoma without change. The etiology of  hemorrhage is not determined by this study. 3. Near complete evacuation of previous diffuse colonic fluid.   4. Small right greater than left pleural effusions, presumably sympathetic, with  slight progression and with adjacent atelectasis    So far stable, continue h/h monitoring

## 2020-12-22 PROBLEM — T14.8XXA HEMATOMA: Status: ACTIVE | Noted: 2020-12-22

## 2020-12-22 LAB
ABO + RH BLD: NORMAL
ALBUMIN SERPL-MCNC: 2.7 G/DL (ref 3.4–5)
ANION GAP SERPL CALC-SCNC: 6 MMOL/L (ref 3–18)
BASOPHILS # BLD: 0 K/UL (ref 0–0.1)
BASOPHILS NFR BLD: 0 % (ref 0–2)
BLD PROD TYP BPU: NORMAL
BLOOD GROUP ANTIBODIES SERPL: NORMAL
BPU ID: NORMAL
BUN SERPL-MCNC: 61 MG/DL (ref 7–18)
BUN/CREAT SERPL: 27 (ref 12–20)
C-ANCA TITR SER IF: NORMAL TITER
C3 SERPL-MCNC: 172 MG/DL (ref 82–167)
C4 SERPL-MCNC: 42 MG/DL (ref 12–38)
CALCIUM SERPL-MCNC: 8.1 MG/DL (ref 8.5–10.1)
CALLED TO:,BCALL1: NORMAL
CHLORIDE SERPL-SCNC: 113 MMOL/L (ref 100–111)
CO2 SERPL-SCNC: 24 MMOL/L (ref 21–32)
CREAT SERPL-MCNC: 2.26 MG/DL (ref 0.6–1.3)
CROSSMATCH RESULT,%XM: NORMAL
DIFFERENTIAL METHOD BLD: ABNORMAL
EOSINOPHIL # BLD: 0.1 K/UL (ref 0–0.4)
EOSINOPHIL NFR BLD: 1 % (ref 0–5)
ERYTHROCYTE [DISTWIDTH] IN BLOOD BY AUTOMATED COUNT: 15.5 % (ref 11.6–14.5)
GLUCOSE SERPL-MCNC: 120 MG/DL (ref 74–99)
HBV CORE AB SERPL QL IA: NEGATIVE
HBV SURFACE AB SER QL IA: NEGATIVE
HBV SURFACE AB SERPL IA-ACNC: <3.1 MIU/ML
HBV SURFACE AG SER QL: <0.1 INDEX
HBV SURFACE AG SER QL: NEGATIVE
HCT VFR BLD AUTO: 22 % (ref 36–48)
HCT VFR BLD AUTO: 23.2 % (ref 36–48)
HCT VFR BLD AUTO: 24.4 % (ref 36–48)
HCT VFR BLD AUTO: 25.3 % (ref 36–48)
HCV AB SER IA-ACNC: 0.05 INDEX
HCV AB SERPL QL IA: NEGATIVE
HCV COMMENT,HCGAC: NORMAL
HEP BS AB COMMENT,HBSAC: ABNORMAL
HGB BLD-MCNC: 7.4 G/DL (ref 13–16)
HGB BLD-MCNC: 7.5 G/DL (ref 13–16)
HGB BLD-MCNC: 8 G/DL (ref 13–16)
HGB BLD-MCNC: 8.3 G/DL (ref 13–16)
LYMPHOCYTES # BLD: 1.9 K/UL (ref 0.9–3.6)
LYMPHOCYTES NFR BLD: 17 % (ref 21–52)
MAGNESIUM SERPL-MCNC: 2.7 MG/DL (ref 1.6–2.6)
MCH RBC QN AUTO: 29 PG (ref 24–34)
MCHC RBC AUTO-ENTMCNC: 33.6 G/DL (ref 31–37)
MCV RBC AUTO: 86.3 FL (ref 74–97)
MONOCYTES # BLD: 1 K/UL (ref 0.05–1.2)
MONOCYTES NFR BLD: 9 % (ref 3–10)
MYELOPEROXIDASE AB SER IA-ACNC: <9 U/ML (ref 0–9)
NEUTS SEG # BLD: 7.9 K/UL (ref 1.8–8)
NEUTS SEG NFR BLD: 73 % (ref 40–73)
P-ANCA ATYPICAL TITR SER IF: NORMAL TITER
P-ANCA TITR SER IF: NORMAL TITER
PHOSPHATE SERPL-MCNC: 3.3 MG/DL (ref 2.5–4.9)
PLATELET # BLD AUTO: 175 K/UL (ref 135–420)
PMV BLD AUTO: 9.2 FL (ref 9.2–11.8)
POTASSIUM SERPL-SCNC: 4.9 MMOL/L (ref 3.5–5.5)
PROTEINASE3 AB SER IA-ACNC: <3.5 U/ML (ref 0–3.5)
RBC # BLD AUTO: 2.55 M/UL (ref 4.7–5.5)
SODIUM SERPL-SCNC: 143 MMOL/L (ref 136–145)
SPECIMEN EXP DATE BLD: NORMAL
STATUS OF UNIT,%ST: NORMAL
UNIT DIVISION, %UDIV: 0
WBC # BLD AUTO: 10.8 K/UL (ref 4.6–13.2)

## 2020-12-22 PROCEDURE — 36415 COLL VENOUS BLD VENIPUNCTURE: CPT

## 2020-12-22 PROCEDURE — 83735 ASSAY OF MAGNESIUM: CPT

## 2020-12-22 PROCEDURE — 85014 HEMATOCRIT: CPT

## 2020-12-22 PROCEDURE — 80069 RENAL FUNCTION PANEL: CPT

## 2020-12-22 PROCEDURE — 74011250637 HC RX REV CODE- 250/637: Performed by: HOSPITALIST

## 2020-12-22 PROCEDURE — 65660000000 HC RM CCU STEPDOWN

## 2020-12-22 PROCEDURE — 85025 COMPLETE CBC W/AUTO DIFF WBC: CPT

## 2020-12-22 RX ADMIN — Medication 5 ML: at 08:00

## 2020-12-22 RX ADMIN — Medication 5 ML: at 16:00

## 2020-12-22 RX ADMIN — PANTOPRAZOLE SODIUM 40 MG: 40 TABLET, DELAYED RELEASE ORAL at 09:13

## 2020-12-22 RX ADMIN — Medication 10 ML: at 03:28

## 2020-12-22 NOTE — PROGRESS NOTES
Bedside and Verbal shift change report given to SILVANO Estrada RN (oncoming nurse) by Kale Thomas RN (offgoing nurse). Report included the following information SBAR, Kardex, Intake/Output, MAR and Recent Results. Shift uneventful with no c/o chest pain or sob. Bedside and Verbal shift change report given to Chanelle Villarreal RN (oncoming nurse) by Iliana Jim RN (offgoing nurse). Report included the following information SBAR, Kardex, Intake/Output, MAR and Recent Results.

## 2020-12-22 NOTE — PROGRESS NOTES
Physician Progress Note      Raghav De La Vega  CSN #:                  [id number]  :                       1960  ADMIT DATE:       2020 7:13 PM  [address] [place] DATE:  RESPONDING  PROVIDER #:        Karol MESSER MD          QUERY TEXT:    Pt admitted with Acute renal failure and has anemia documented. If possible, please document in progress notes and discharge summary further specificity regarding the acuity and type of anemia:      The medical record reflects the following:    Risk Factors: 58 yo, adm with ARF, Hypotensive SBP 96/53,  mildly tachycardic and diaphoretic. Clinical Indicators: Distended abdomen =>Stat ct abdomen done: mild/moderate hemoperitoneum . Admitting H/H 10.4/31.8     6.8/20.4   after 1 UPRBC 7.  Treatment: 1 UPRBC,  stop heparin, trend H/H, and watch clinically    Thank you,  José Manuel Langley RN, CCDS  [phone]  Options provided:  -- Anemia due to acute blood loss  -- Anemia due to chronic blood loss  -- Anemia due to acute on chronic blood loss  -- Anemia due to iron deficiency  -- Anemia due to chronic disease  -- Other - I will add my own diagnosis  -- Disagree - Not applicable / Not valid  -- Disagree - Clinically unable to determine / Unknown  -- Refer to Clinical Documentation Reviewer    PROVIDER RESPONSE TEXT:    This patient has acute blood loss anemia. Query created by:  Eliezer Kohler on 2020 10:00 AM      Electronically signed by:  Karol Charles MD 2020 10:07 AM

## 2020-12-22 NOTE — PROGRESS NOTES
Problem: Falls - Risk of  Goal: *Absence of Falls  Description: Document Braulio Sol Fall Risk and appropriate interventions in the flowsheet.   Outcome: Progressing Towards Goal  Note: Fall Risk Interventions:  Mobility Interventions: Assess mobility with egress test, Bed/chair exit alarm, Communicate number of staff needed for ambulation/transfer, OT consult for ADLs, Patient to call before getting OOB, PT Consult for mobility concerns, PT Consult for assist device competence, Strengthening exercises (ROM-active/passive), Utilize walker, cane, or other assistive device         Medication Interventions: Teach patient to arise slowly, Patient to call before getting OOB, Evaluate medications/consider consulting pharmacy, Bed/chair exit alarm    Elimination Interventions: Bed/chair exit alarm, Call light in reach, Patient to call for help with toileting needs, Toilet paper/wipes in reach, Toileting schedule/hourly rounds, Urinal in reach              Problem: Patient Education: Go to Patient Education Activity  Goal: Patient/Family Education  Outcome: Progressing Towards Goal     Problem: Pain  Goal: *Control of Pain  Outcome: Progressing Towards Goal  Goal: *PALLIATIVE CARE:  Alleviation of Pain  Outcome: Progressing Towards Goal     Problem: Patient Education: Go to Patient Education Activity  Goal: Patient/Family Education  Outcome: Progressing Towards Goal     Problem: Breathing Pattern - Ineffective  Goal: *Absence of hypoxia  Outcome: Progressing Towards Goal  Goal: *Use of effective breathing techniques  Outcome: Progressing Towards Goal  Goal: *PALLIATIVE CARE:  Alleviation of Dyspnea  Outcome: Progressing Towards Goal     Problem: Patient Education: Go to Patient Education Activity  Goal: Patient/Family Education  Outcome: Progressing Towards Goal     Problem: Anemia Care Plan (Adult and Pediatric)  Goal: *Labs within defined limits  Outcome: Progressing Towards Goal  Goal: *Tolerates increased activity  Outcome: Progressing Towards Goal

## 2020-12-22 NOTE — PROGRESS NOTES
Nephrology Progress note    Subjective:     Lucille Marshall is a 56 y.o. male with past medical history significant for HTN who presented to the ED with Seizure, new onset, weakness and decreased appetite/intake. Patient noted to be Hypotensive SBP 96/53 mildly tachycardic and diaphoretic. Labs revealed a S Cr 5.6 and repeat this a.m. up to 6.1. CPK was 220 and repeat today 100. Patient had previously c/o Right wrist pains at the Urgent care center and given NSAID indocin. He stated that he only took 2 doses. Denies any other NSAID use. Denied illicit drug use. No recent Skin rash. No h/o lupus, Rheumatoid arthritis. Bell catheter this a.m. only yeilded ~ 100ml urine. CT scan of head was unremarkable. V/Q scan normal. Cr 6.1=>3.9. Denies abd pain, Hg dropped 8=>6.9    CT abd-1. Mild to moderate volume of hemoperitoneum of uncertain etiology tracking  along the inferior right hepatic lobe and into the central mesentery with a  small amount of pelvic free fluid as well. Unremarkable kidneys  2. Inflammatory changes in the duodenum as well as distended and partially  fluid-filled stomach and liquid stool throughout the colon may represent  gastroenteritis and nonspecific diarrheal illness and may be related to the  above-described pneumoperitoneum. Superimposed peptic ulcer disease not  excluded. No evidence of pneumatosis or free air, however. Stable this AM.  Cr down to 2.2, UO over 2.5L yesterday.     Admit Date: 12/19/2020      Allergy:  Allergies   Allergen Reactions    Influenza Virus Vaccines Anaphylaxis        Objective:     Visit Vitals  /76 (BP 1 Location: Left arm, BP Patient Position: At rest;Supine)   Pulse 94   Temp 98.9 °F (37.2 °C)   Resp 18   Ht 6' (1.829 m)   Wt 108.9 kg (240 lb)   SpO2 96%   BMI 32.55 kg/m²         Intake/Output Summary (Last 24 hours) at 12/22/2020 0801  Last data filed at 12/22/2020 1504  Gross per 24 hour   Intake --   Output 2500 ml   Net -2500 ml       Physical Exam:       General: No acute distress   HENT: Atraumatic and normocephalic   Eyes: Normal conjunctiva   Neck: Supple with no JVD   Cardiovascular: Normal S1 & S2, no m/r/g   Pulmonary/Chest Wall: Clear to auscultation bilaterally   Abdominal: Soft and non-tender   Musculoskeletal: no edema   Neurological: No focal deficits       Data Review:    Lab Results   Component Value Date/Time    WBC 10.8 12/22/2020 01:30 AM    RBC 2.55 (L) 12/22/2020 01:30 AM    HCT 22.0 (L) 12/22/2020 01:30 AM    MCV 86.3 12/22/2020 01:30 AM    MCH 29.0 12/22/2020 01:30 AM    MCHC 33.6 12/22/2020 01:30 AM    RDW 15.5 (H) 12/22/2020 01:30 AM      Lab Results   Component Value Date    IRON 14 (L) 12/21/2020   No components found for: FERRITIN  No components found for: PTHINT  Urinalysis  No results found for: UGLU          Impression:     1. Acute Kidney Injury, most likely ATN sec to NSAID + Intravascular volume depletion. Patient was also on ACEI, Lisinopril which may be contributory in setting of dehydration and NSAID use. Hyaline casts noted on U/A. Also Possible interstitial Nephritis. Poppy 37. Cr down to 2.2 now. 2. Proteinuria, Poss  tubulointerstitial from ATN. Possible underlying Hypertensive Nephrosclerosis. ? Underlying glomerulonephritis. 3. Anemia  4. Hypotension  5. H/o HTN  6. Arthalgia R wrist, possible gout  7.  Anemia- r/o GIB    Plan:     Cont IV fluid  Pending serologies: Complements C3, C4, JAVIER,  Rh factor , ANCA , Hep B and C panel,   Pending SIFE, TSAT Ferritin  Avoid NSAIDs, IV radiocontrast  AM renal panel  Strict I/Os    Lesli Conteh MD  [facility]  [phone]

## 2020-12-22 NOTE — PROGRESS NOTES
Hospitalist Progress Note-critical care note     Patient: Rosa Dear MRN: [id number]  [facility]: [id number]    YOB: 1960  Age: 56 y.o. Sex: male    DOA: 12/19/2020 LOS:  LOS: 3 days            Chief complaint: anemia , distended abdomen, hypotension elevated trop     Assessment/Plan         Hospital Problems  Never Reviewed          Codes Class Noted POA    Distended abdomen ICD-10-CM: R14.0  ICD-9-CM: 787.3  12/20/2020 Unknown        Anemia ICD-10-CM: D64.9  ICD-9-CM: 285.9  12/20/2020 Unknown        * (Principal) ARF (acute renal failure) ([facility] [address] ICD-10-CM: N17.9  ICD-9-CM: 584.9  12/19/2020 Unknown        Hypotension ICD-10-CM: I95.9  ICD-9-CM: 458.9  12/19/2020 Unknown        Elevated troponin ICD-10-CM: R77.8  ICD-9-CM: 790.6  12/19/2020 Unknown              Pt presented fatigue on admission, he was found Olivia -he did took nsaids for 2 dose last Wednesday, renal on board. He reported his abdomen had been distended and no abdomen pain. Ct scan hemoperitoneum-repeated ct stable. cta hold due to olivia, surgeon on board        Acute renal failure -  Continue Improving  per iv hydration -continue low rate iv hydration   Renal is working up for the etiology      Hypotension  Resolved     Hypoxia -  Resolved  CXR wnl   Cannot get CTA chest because of creatinine   V/Q scan:  WNL     Distended abdomen /hematoma   Stable from ct scan -size mild improving   Abdomen exam has been benign, vitals remained stable    ct abdomen done: mild/moderate hemoperitoneum -case discussed with Dr. Christopher Keenan        Elevated troponin -  Presenting troponin 0.19-trending down, no chest pain -  Echo: ef wnl and and The left ventricular wall motion is normal.      Anemia   Respond to blood transfusion    hemoperitoneum : so far stable from ct scan -Dr. Christopher Keenan discussed with IR Anastacio romeo      Subjective:  no abdomen pain     Discussed with pt and sister again about the benefit of possible finding the bleeding site per cta and the risk of end of hemodialysis from iv contrast -if we need urgent cta . They both indicated verbal understanding the benefit and the risk. So far his h/h respond to transfusion and ct from yesterday-mild improving, will hold cta for now-will do it while needed     Disposition :tbd,   Review of systems:    General: No fevers or chills. Cardiovascular: No chest pain or pressure. No palpitations. Pulmonary: No shortness of breath. Gastrointestinal: No nausea, vomiting. Vital signs/Intake and Output:  Visit Vitals  /76 (BP 1 Location: Left arm, BP Patient Position: At rest;Supine)   Pulse 94   Temp 98.9 °F (37.2 °C)   Resp 18   Ht 6' (1.829 m)   Wt 108.9 kg (240 lb)   SpO2 96%   BMI 32.55 kg/m²     Current Shift:  12/22 0701 - 12/22 1900  In: 100 [P.O.:100]  Out: 400 [Urine:400]  Last three shifts:  12/20 1901 - 12/22 0700  In: -   Out: 3158 [Urine:3550]    Physical Exam:  General: WD, WN. Alert, cooperative, no acute distress    HEENT: NC, Atraumatic. PERRLA, anicteric sclerae. Lungs: CTA Bilaterally. No Wheezing/Rhonchi/Rales. Heart:  Regular  rhythm,  No murmur, No Rubs, No Gallops  Abdomen: Soft, +distended, Non tender. +Bowel sounds,   Extremities: No c/c/e  Psych:   Not anxious or agitated. Neurologic:  No acute neurological deficit.              Labs: Results:       Chemistry Recent Labs     12/22/20  0130 12/21/20  0550 12/20/20  0113 12/19/20  1915   * 115* 147* 171*    140 138 140   K 4.9 4.7 4.7 4.6   * 108 102 99*   CO2 24 24 25 27   BUN 61* 65* 51* 42*   CREA 2.26* 3.95* 6.13* 5.68*   CA 8.1* 7.7*  7.4* 8.2* 9.0   AGAP 6 8 11 14   BUCR 27* 16 8* 7*   AP  --   --   --  82   TP  --   --   --  7.6   ALB 2.7* 2.8*  --  3.7   GLOB  --   --   --  3.9   AGRAT  --   --   --  0.9      CBC w/Diff Recent Labs     12/22/20  0130 12/21/20  0955 12/21/20  0550 12/20/20  0113 12/20/20  0113   WBC 10.8  --  11.6  --  12.7   RBC 2.55*  --  2.42*  --  2.92*   HGB 7.4* 6.8* 6.9* < > 8.2*   HCT 22.0* 20.4* 20.8*   < > 24.8*     --  185  --  208   GRANS 73  --  79*  --  76*   LYMPH 17*  --  12*  --  14*   EOS 1  --  0  --  0    < > = values in this interval not displayed.      Cardiac Enzymes Recent Labs     12/20/20  1237 12/20/20  0113    100   CKND1 1.9 1.1      Coagulation Recent Labs     12/20/20  1237   PTP 14.4   INR 1.1   APTT 28.6       Lipid Panel No results found for: CHOL, CHOLPOCT, CHOLX, CHLST, CHOLV, 700843, HDL, HDLP, LDL, LDLC, DLDLP, 515471, VLDLC, VLDL, TGLX, TRIGL, TRIGP, TGLPOCT, CHHD, CHHDX   BNP No results for input(s): BNPP in the last 72 hours.   Liver Enzymes Recent Labs     12/22/20  0130 12/19/20  1915 12/19/20 1915   TP  --   --  7.6   ALB 2.7*   < > 3.7   AP  --   --  82    < > = values in this interval not displayed.      Thyroid Studies No results found for: T4, T3U, TSH, TSHEXT, TSHEXT     Procedures/imaging: see electronic medical records for all procedures/Xrays and details which were not copied into this note but were reviewed prior to creation of Plan    Nm Lung Vent/perf    Result Date: 12/19/2020  History: Pulmonary embolus. Dose: 6.6 mCi 99 technetium MAA with left antecubital fossa injection site. This is a perfusion only study. There is no chest x-ray for comparison. The distribution of the radiotracer with the 8 different views post injection of the perfusion imaging material demonstrates physiologic distribution..     IMPRESSION: Normal ventilation only lung scan    Xr Wrist Rt Ap/lat/obl Min 3v    Result Date: 12/18/2020  History: Swelling. No trauma. COMPARISON: None. 3 views of the right wrist demonstrate degenerative joint space narrowing with osteophyte formation at the first metacarpal/carpal joint. This appears moderately severe. No acute fracture. No dislocation. Bone density normal. There may be some soft tissue swelling dorsum of the hand and wrist. This is nonspecific.     IMPRESSION: 1. Degenerative joint disease at the  base of the thumb. No fracture. Ct Head Wo Cont    Result Date: 12/18/2020  EXAM: CT HEAD, WITHOUT IV CONTRAST COMPARISON: None INDICATION: Possible seizure TECHNIQUE: Multiple axial CT images of the head were obtained extending from the skull base through the vertex. Additional coronal and sagittal reformations were also performed. One or more dose reduction techniques were used on this CT: automated exposure control, adjustment of the mAs and/or kVp according to patient size, and iterative reconstruction techniques. The specific techniques used on this CT exam have been documented in the patient's electronic medical record. Digital Imaging and Communications in Medicine (DICOM) format image data are available to nonaffiliated external healthcare facilities or entities on a secure, media free, reciprocally searchable basis with patient authorization for at least a 12-month period after this study. _______________ FINDINGS: BRAIN AND POSTERIOR FOSSA: The sulci, folia, ventricles and basal cisterns are within normal limits for the patient's age. There is no intracranial hemorrhage, mass effect, or midline shift. There are no areas of abnormal parenchymal attenuation. EXTRA-AXIAL SPACES AND MENINGES: There are no abnormal extra-axial fluid collections. CALVARIUM: No acute osseous abnormality. OTHER: The visualized portions of the paranasal sinuses and mastoid air cells are clear. _______________     IMPRESSION: No CT evidence of an acute intracranial abnormality - in particular, no acute cortical infarct, hemorrhage, or mass effect. Ct Abd Pelv Wo Cont    Result Date: 12/20/2020  EXAM: CT of the abdomen and pelvis CLINICAL INDICATION/HISTORY: abdomen distended, dilcia r/o obstruction   > Additional: None. COMPARISON: None. > Reference Exam: None. TECHNIQUE: Axial CT imaging of the abdomen and pelvis was performed without intravenous contrast. Multiplanar reformats were generated.  One or more dose reduction techniques were used on this CT: automated exposure control, adjustment of the mAs and/or kVp according to patient size, and iterative reconstruction techniques. The specific techniques used on this CT exam have been documented in the patient's electronic medical record. Digital Imaging and Communications in Medicine (DICOM) format image data are available to nonaffiliated external healthcare facilities or entities on a secure, media free, reciprocally searchable basis with patient authorization for at least a 12-month period after this study. _______________ FINDINGS: LOWER CHEST: Small right pleural effusion and atelectasis with respiratory motion in the lung bases. Partially visualized bilateral gynecomastia. Low-attenuation of the blood pool suggesting anemia. LIVER, BILIARY: Diffuse low-attenuation of the liver compatible with steatosis. No biliary dilation. Gallbladder is unremarkable. PANCREAS: Normal. SPLEEN: Normal. ADRENALS: Normal. KIDNEYS/URETERS/BLADDER: Bell catheter in place within the urinary bladder. Several locules of gas in keeping with recent image mentation. Unenhanced kidneys are unremarkable. LYMPH NODES: No enlarged lymph nodes. GASTROINTESTINAL TRACT: Assessment is limited given lack of contrast. There is suggestion of abnormal wall thickening involving the duodenum. The stomach is partially distended and fluid-filled with an air-fluid level. In addition, there is liquid stool throughout the colon to the rectum, compatible with nonspecific diarrheal illness. PELVIC ORGANS: Small amount of hemoperitoneum extends into the pelvis. VASCULATURE: Unremarkable. BONES: No acute or aggressive osseous abnormalities identified. Degenerative changes are noted including bridging osteophyte formation about both SI joints.  OTHER: High attenuating fluid along the inferior right hepatic lobe extending into the central mesentery is noted which measures 50-60 Hounsfield units compatible with hemoperitoneum. _______________     IMPRESSION: 1. Mild to moderate volume of hemoperitoneum of uncertain etiology tracking along the inferior right hepatic lobe and into the central mesentery with a small amount of pelvic free fluid as well. 2. Inflammatory changes in the duodenum as well as distended and partially fluid-filled stomach and liquid stool throughout the colon may represent gastroenteritis and nonspecific diarrheal illness and may be related to the above-described pneumoperitoneum. Superimposed peptic ulcer disease not excluded. No evidence of pneumatosis or free air, however. Us Retroperitoneum Comp    Result Date: 12/20/2020  EXAM: Complete retroperitoneal ultrasound INDICATION: Pain. COMPARISON: None. _______________ FINDINGS: RIGHT KIDNEY: 9.9 cm in length. No hydronephrosis. No solid renal mass. No visible calculi. Normal renal echotexture and cortical thickness. LEFT KIDNEY: 11.4 cm in length. No hydronephrosis. No solid renal mass. No visible calculi. Normal renal echotexture and cortical thickness. BLADDER: Bladder is decompressed with a Bell catheter in place. OTHER: Prostate was not adequately visualized. _______________     IMPRESSION:  Unremarkable ultrasound of the kidneys. Bell catheter in place within the decompressed urinary bladder. Xr Chest Port    Result Date: 12/19/2020  History: Short of breath. COMPARISON: None. Portable chest x-ray performed. There is elevation of the right hemidiaphragm which is nonspecific. No focal infiltrate, pneumothorax or effusion. Heart size appears normal. Mediastinum appears normal. No skeletal abnormalities. IMPRESSION: 1. No acute pulmonary disease.       Rickey Romero MD

## 2020-12-22 NOTE — PROGRESS NOTES
Care manager rounded; patient sitting up in chair and stated that nephrologist had just been in to review lab results with patient. Verified patient has discharge ride home with sister; care manager will continue to follow for discharge needs.

## 2020-12-22 NOTE — PROGRESS NOTES
Surgery Progress Note    Patient: Joaquín Chapa MRN: [id number]  CSN: [id number]    YOB: 1960  Age: 56 y.o. Sex: male    DOA: 12/19/2020 LOS:  LOS: 2 days          Chief Complaint:          Assessment/Plan     Discussed case at length with Dr. Maria Esther ROWE and Dr. Kwasi Duggan.  Also discussed case with pt and sister. Do not recommend exploratory laparotomy for unknown etiology while pt is stable and without peritoneal signs or abd pain. Plan for transfusion. If bleeding continues then risk of contrast is supported for localization and possible embolization prior to surgical intervention. All above are in agreement. Patient Active Problem List   Diagnosis Code    ARF (acute renal failure) ([facility] [address] N17.9    Hypotension I95.9    Hypoxia R09.02    Elevated troponin R77.8    Distended abdomen R14.0    Anemia D64.9       Subjective: Thirsty    Review of systems:    Constitutional: denies fevers, chills, myalgias  Respiratory: denies SOB, cough  Cardiovascular: denies chest pain, palpitations  Gastrointestinal: denies nausea, vomiting, diarrhea      Vital signs/Intake and Output:  Visit Vitals  BP (!) 134/58 (BP 1 Location: Left arm, BP Patient Position: At rest)   Pulse 98   Temp 98.1 °F (36.7 °C)   Resp 18   Ht 6' (1.829 m)   Wt 108.9 kg (240 lb)   SpO2 96%   BMI 32.55 kg/m²     Current Shift:  No intake/output data recorded. Last three shifts:  12/20 0701 - 12/21 1900  In: 1830.4 [P.O.:220; I.V.:1610.4]  Out: 5511 [Urine:2875]    Exam:    General: Well developed, alert, NAD, OX3  Head/Neck: NCAT, supple, No masses, No lymphadenopathy  CVS:Regular rate and rhythm,   Lungs:Clear to auscultation bilaterally, no wheezes,  Abdomen: Soft, Nontender, ?  Distention vs normal round abd, Normal Bowel sounds, No hepatomegaly  Extremities: No C/C/E, pulses palpable 2+  Skin:normal texture and turgor, no rashes, no lesions  Neuro:grossly normal , follows commands  Psych:appropriate                Labs: Results:       Chemistry Recent Labs     12/21/20  0550 12/20/20  0113 12/19/20 1915   * 147* 171*    138 140   K 4.7 4.7 4.6    102 99*   CO2 24 25 27   BUN 65* 51* 42*   CREA 3.95* 6.13* 5.68*   CA 7.7*  7.4* 8.2* 9.0   AGAP 8 11 14   BUCR 16 8* 7*   AP  --   --  82   TP  --   --  7.6   ALB 2.8*  --  3.7   GLOB  --   --  3.9   AGRAT  --   --  0.9      CBC w/Diff Recent Labs     12/21/20  0955 12/21/20  0550 12/20/20  1620 12/20/20  0113 12/19/20 1915   WBC  --  11.6  --  12.7 14.0*   RBC  --  2.42*  --  2.92* 3.41*   HGB 6.8* 6.9* 8.0* 8.2* 9.5*   HCT 20.4* 20.8* 24.3* 24.8* 28.9*   PLT  --  185  --  208 269   GRANS  --  79*  --  76* 76*   LYMPH  --  12*  --  14* 16*   EOS  --  0  --  0 0      Cardiac Enzymes Recent Labs     12/20/20  1237 12/20/20  0113    100   CKND1 1.9 1.1      Coagulation Recent Labs     12/20/20  1237   PTP 14.4   INR 1.1   APTT 28.6       Lipid Panel No results found for: CHOL, CHOLPOCT, CHOLX, CHLST, CHOLV, 050136, HDL, HDLP, LDL, LDLC, DLDLP, 905105, VLDLC, VLDL, TGLX, TRIGL, TRIGP, TGLPOCT, CHHD, CHHDX   BNP No results for input(s): BNPP in the last 72 hours.    Liver Enzymes Recent Labs     12/21/20  0550 12/19/20 1915   TP  --  7.6   ALB 2.8* 3.7   AP  --  82      Thyroid Studies No results found for: T4, T3U, TSH, TSHEXT     Procedures/imaging: see electronic medical records for all procedures/Xrays and details which were not copied into this note but were reviewed prior to creation of Plan      Eli Higgins DO

## 2020-12-22 NOTE — PROGRESS NOTES
1927:  Assumed care for patient, received bedside report from Nurys Potts RN. Patient quietly lying in bed with eyes closed, chest rising and falling. Patient has no complaints of pain or discomfort at the time. PRBC running at 100ml/hr. Whiteboard updated, bed at the lowest position with call bell within reach. 3 [place] [facility] Night Shift Chart Audit    Chart Audit completed? YES.    2230:  Blood infusion complete with no interactions. Unit not released in chart, unable to complete infusion in system. Shift uneventful. Bedside and Verbal shift change report given to Ezra Tracy RN (oncoming nurse) by Estelita Red RN   (offgoing nurse). Report included the following information SBAR, Kardex, ED Summary, Procedure Summary, Intake/Output, MAR, Recent Results, Med Rec Status, Cardiac Rhythm SR\ST and Alarm Parameters .

## 2020-12-23 LAB
ALBUMIN SERPL ELPH-MCNC: 3 G/DL (ref 2.9–4.4)
ALBUMIN SERPL-MCNC: 2.8 G/DL (ref 3.4–5)
ALBUMIN/GLOB SERPL: 1.1 {RATIO} (ref 0.7–1.7)
ALPHA1 GLOB SERPL ELPH-MCNC: 0.4 G/DL (ref 0–0.4)
ALPHA2 GLOB SERPL ELPH-MCNC: 0.9 G/DL (ref 0.4–1)
ANA TITR SER IF: NEGATIVE {TITER}
ANION GAP SERPL CALC-SCNC: 5 MMOL/L (ref 3–18)
APPEARANCE UR: ABNORMAL
B-GLOBULIN SERPL ELPH-MCNC: 0.9 G/DL (ref 0.7–1.3)
BACTERIA URNS QL MICRO: ABNORMAL /HPF
BASOPHILS # BLD: 0 K/UL (ref 0–0.1)
BASOPHILS NFR BLD: 0 % (ref 0–2)
BILIRUB UR QL: NEGATIVE
BUN SERPL-MCNC: 40 MG/DL (ref 7–18)
BUN/CREAT SERPL: 25 (ref 12–20)
CALCIUM SERPL-MCNC: 8.6 MG/DL (ref 8.5–10.1)
CHLORIDE SERPL-SCNC: 109 MMOL/L (ref 100–111)
CO2 SERPL-SCNC: 27 MMOL/L (ref 21–32)
COLOR UR: YELLOW
CREAT SERPL-MCNC: 1.61 MG/DL (ref 0.6–1.3)
DIFFERENTIAL METHOD BLD: ABNORMAL
EOSINOPHIL # BLD: 0.2 K/UL (ref 0–0.4)
EOSINOPHIL NFR BLD: 2 % (ref 0–5)
EPITH CASTS URNS QL MICRO: ABNORMAL /LPF (ref 0–5)
ERYTHROCYTE [DISTWIDTH] IN BLOOD BY AUTOMATED COUNT: 15.5 % (ref 11.6–14.5)
GAMMA GLOB SERPL ELPH-MCNC: 0.8 G/DL (ref 0.4–1.8)
GLOBULIN SER-MCNC: 2.9 G/DL (ref 2.2–3.9)
GLUCOSE SERPL-MCNC: 117 MG/DL (ref 74–99)
GLUCOSE UR STRIP.AUTO-MCNC: 100 MG/DL
HCT VFR BLD AUTO: 23.9 % (ref 36–48)
HCT VFR BLD AUTO: 24.7 % (ref 36–48)
HCT VFR BLD AUTO: 26.2 % (ref 36–48)
HGB BLD-MCNC: 7.8 G/DL (ref 13–16)
HGB BLD-MCNC: 8.1 G/DL (ref 13–16)
HGB BLD-MCNC: 8.5 G/DL (ref 13–16)
HGB UR QL STRIP: ABNORMAL
HYALINE CASTS URNS QL MICRO: ABNORMAL /LPF (ref 0–2)
IGA SERPL-MCNC: 281 MG/DL (ref 61–437)
IGG SERPL-MCNC: 975 MG/DL (ref 603–1613)
IGM SERPL-MCNC: 34 MG/DL (ref 20–172)
INTERPRETATION SERPL IEP-IMP: ABNORMAL
KETONES UR QL STRIP.AUTO: NEGATIVE MG/DL
LEUKOCYTE ESTERASE UR QL STRIP.AUTO: ABNORMAL
LYMPHOCYTES # BLD: 2 K/UL (ref 0.9–3.6)
LYMPHOCYTES NFR BLD: 22 % (ref 21–52)
M PROTEIN SERPL ELPH-MCNC: ABNORMAL G/DL
MAGNESIUM SERPL-MCNC: 2.4 MG/DL (ref 1.6–2.6)
MCH RBC QN AUTO: 28.3 PG (ref 24–34)
MCHC RBC AUTO-ENTMCNC: 32.6 G/DL (ref 31–37)
MCV RBC AUTO: 86.6 FL (ref 74–97)
MONOCYTES # BLD: 1.1 K/UL (ref 0.05–1.2)
MONOCYTES NFR BLD: 11 % (ref 3–10)
NEUTS SEG # BLD: 6.1 K/UL (ref 1.8–8)
NEUTS SEG NFR BLD: 65 % (ref 40–73)
NITRITE UR QL STRIP.AUTO: NEGATIVE
PH UR STRIP: 5 [PH] (ref 5–8)
PHOSPHATE SERPL-MCNC: 2.6 MG/DL (ref 2.5–4.9)
PLATELET # BLD AUTO: 193 K/UL (ref 135–420)
PLEASE NOTE, 734348: NORMAL
PMV BLD AUTO: 9.5 FL (ref 9.2–11.8)
POTASSIUM SERPL-SCNC: 4.7 MMOL/L (ref 3.5–5.5)
PROT SERPL-MCNC: 5.9 G/DL (ref 6–8.5)
PROT UR STRIP-MCNC: 100 MG/DL
RBC # BLD AUTO: 2.76 M/UL (ref 4.7–5.5)
RBC #/AREA URNS HPF: >100 /HPF (ref 0–5)
SODIUM SERPL-SCNC: 141 MMOL/L (ref 136–145)
SP GR UR REFRACTOMETRY: 1.02 (ref 1–1.03)
UROBILINOGEN UR QL STRIP.AUTO: 1 EU/DL (ref 0.2–1)
WBC # BLD AUTO: 9.3 K/UL (ref 4.6–13.2)
WBC URNS QL MICRO: ABNORMAL /HPF (ref 0–5)

## 2020-12-23 PROCEDURE — 80069 RENAL FUNCTION PANEL: CPT

## 2020-12-23 PROCEDURE — 87086 URINE CULTURE/COLONY COUNT: CPT

## 2020-12-23 PROCEDURE — 85025 COMPLETE CBC W/AUTO DIFF WBC: CPT

## 2020-12-23 PROCEDURE — 74011250636 HC RX REV CODE- 250/636: Performed by: HOSPITALIST

## 2020-12-23 PROCEDURE — 77030040361 HC SLV COMPR DVT MDII -B

## 2020-12-23 PROCEDURE — 65660000000 HC RM CCU STEPDOWN

## 2020-12-23 PROCEDURE — 83735 ASSAY OF MAGNESIUM: CPT

## 2020-12-23 PROCEDURE — 74011250637 HC RX REV CODE- 250/637: Performed by: HOSPITALIST

## 2020-12-23 PROCEDURE — 36415 COLL VENOUS BLD VENIPUNCTURE: CPT

## 2020-12-23 PROCEDURE — 85014 HEMATOCRIT: CPT

## 2020-12-23 PROCEDURE — 81001 URINALYSIS AUTO W/SCOPE: CPT

## 2020-12-23 RX ADMIN — SODIUM CHLORIDE 75 ML/HR: 900 INJECTION, SOLUTION INTRAVENOUS at 04:54

## 2020-12-23 RX ADMIN — SODIUM CHLORIDE 75 ML/HR: 900 INJECTION, SOLUTION INTRAVENOUS at 19:35

## 2020-12-23 RX ADMIN — PANTOPRAZOLE SODIUM 40 MG: 40 TABLET, DELAYED RELEASE ORAL at 06:57

## 2020-12-23 NOTE — ROUTINE PROCESS
1130  Bedside shift change report given to Jammie SALMON RN (oncoming nurse) by Mick Peralta (offgoing nurse). Report included the following information SBAR, Kardex, Intake/Output and MAR.

## 2020-12-23 NOTE — PROGRESS NOTES
1915  Assumed care of pt  2012  Shift assessment complete   Pt alert and oriented resting in bed with eyes open and chest rising and falling evenly   2350   Reassessment complete  0446   Reassessment complete     Bedside and Verbal shift change report given to Russell Martines RN (oncoming nurse) by Sonal Ford (offgoing nurse). Report included the following information SBAR, Kardex, ED Summary, Intake/Output, MAR, Recent Results, Med Rec Status and Cardiac Rhythm NSR.

## 2020-12-23 NOTE — PROGRESS NOTES
Nephrology Progress note    Subjective:     Bertha Srivastava is a 56 y.o. male with past medical history significant for HTN who presented to the ED with Seizure, new onset, weakness and decreased appetite/intake. Patient noted to be Hypotensive SBP 96/53 mildly tachycardic and diaphoretic. Labs revealed a S Cr 5.6 and repeat this a.m. up to 6.1. CPK was 220 and repeat today 100. Patient had previously c/o Right wrist pains at the Urgent care center and given NSAID indocin. He stated that he only took 2 doses. Denies any other NSAID use. Denied illicit drug use. No recent Skin rash. No h/o lupus, Rheumatoid arthritis. Bell catheter this a.m. only yeilded ~ 100ml urine. CT scan of head was unremarkable. V/Q scan normal. Cr 6.1=>3.9. Denies abd pain, Hg dropped 8=>6.9    CT abd-1. Mild to moderate volume of hemoperitoneum of uncertain etiology tracking  along the inferior right hepatic lobe and into the central mesentery with a  small amount of pelvic free fluid as well. Unremarkable kidneys  2. Inflammatory changes in the duodenum as well as distended and partially  fluid-filled stomach and liquid stool throughout the colon may represent  gastroenteritis and nonspecific diarrheal illness and may be related to the  above-described pneumoperitoneum. Superimposed peptic ulcer disease not  excluded. No evidence of pneumatosis or free air, however. Stable this AM, sitting up in chair. Cr down to 2.2, then 1.6, UO over 1.8L yesterday.     Admit Date: 12/19/2020      Allergy:  Allergies   Allergen Reactions    Influenza Virus Vaccines Anaphylaxis        Objective:     Visit Vitals  /66 (BP 1 Location: Left arm)   Pulse 97   Temp 98.9 °F (37.2 °C)   Resp 14   Ht 6' (1.829 m)   Wt 108.9 kg (240 lb)   SpO2 100%   BMI 32.55 kg/m²         Intake/Output Summary (Last 24 hours) at 12/23/2020 0803  Last data filed at 12/22/2020 2356  Gross per 24 hour   Intake 340 ml   Output 1800 ml   Net -1460 ml       Physical Exam:       General: No acute distress   HENT: Atraumatic and normocephalic   Eyes: Normal conjunctiva   Neck: Supple with no JVD   Cardiovascular: Normal S1 & S2, no m/r/g   Pulmonary/Chest Wall: Clear to auscultation bilaterally   Abdominal: Soft and +NABS   Musculoskeletal: no edema   Neurological: No focal deficits       Data Review:    Lab Results   Component Value Date/Time    WBC 9.3 12/23/2020 12:20 AM    RBC 2.76 (L) 12/23/2020 12:20 AM    HCT 23.9 (L) 12/23/2020 12:20 AM    MCV 86.6 12/23/2020 12:20 AM    MCH 28.3 12/23/2020 12:20 AM    MCHC 32.6 12/23/2020 12:20 AM    RDW 15.5 (H) 12/23/2020 12:20 AM      Lab Results   Component Value Date    IRON 14 (L) 12/21/2020   No components found for: FERRITIN  No components found for: PTHINT  Urinalysis  No results found for: UGLU          Impression:     1. Acute Kidney Injury, most likely ATN sec to NSAID + Intravascular volume depletion. Patient was also on ACEI, Lisinopril which may be contributory in setting of dehydration and NSAID use. Hyaline casts noted on U/A. Also Possible interstitial Nephritis. Poppy 37.  Cr down to 2.2 then 1.6 now.  2. Proteinuria, Poss  tubulointerstitial from ATN. Possible underlying Hypertensive Nephrosclerosis. ? Underlying glomerulonephritis.   3. Anemia  4. Hypotension  5. H/o HTN  6. Arthalgia R wrist, possible gout  7. Anemia- r/o GIB    Plan:     Cont IV fluid  Pending serologies: Complements C3, C4, JAVIER,  Rh factor , ANCA , Hep B and C panel,   Pending SIFE, Ferritin  Avoid NSAIDs, IV radiocontrast  AM renal panel    Renny Mata MD  [facility]  [phone]

## 2020-12-23 NOTE — PROGRESS NOTES
Hospitalist Progress Note-critical care note     Patient: Juliana Brooke MRN: [id number]  CSN: [id number]    YOB: 1960  Age: 56 y.o. Sex: male    DOA: 12/19/2020 LOS:  LOS: 4 days            Chief complaint: anemia , distended abdomen, hypotension elevated trop     Assessment/Plan         Hospital Problems  Never Reviewed          Codes Class Noted POA    Hematoma ICD-10-CM: T14. 8XXA  ICD-9-CM: 924.9  12/22/2020 Unknown        Distended abdomen ICD-10-CM: R14.0  ICD-9-CM: 787.3  12/20/2020 Unknown        Anemia ICD-10-CM: D64.9  ICD-9-CM: 285.9  12/20/2020 Unknown        * (Principal) ARF (acute renal failure) ([facility] [address] ICD-10-CM: N17.9  ICD-9-CM: 584.9  12/19/2020 Unknown        Hypotension ICD-10-CM: I95.9  ICD-9-CM: 458.9  12/19/2020 Unknown        Elevated troponin ICD-10-CM: R77.8  ICD-9-CM: 790.6  12/19/2020 Unknown              Pt presented fatigue on admission, he was found Olivia -he did take nsaids for 2 dose last week for wrist pain given to him by urgent care, renal on board. He reported his abdomen had been distended and no abdomen pain. Ct scan hemoperitoneum-repeated ct stable. cta hold due to olivia, surgeon on board who recomends  CTA and possible embolization through IR if hemoglobin drops        Acute renal failure -  Continue Improving  per iv hydration -continue low rate iv hydration   Renal is working up for the etiology      Hypotension  Resolved     Hypoxia -  Resolved  CXR wnl   Cannot get CTA chest because of creatinine   V/Q scan:  WNL     Distended abdomen /hematoma   Stable from ct scan -size mild improving   Abdomen exam has been benign, vitals remained stable    ct abdomen done: mild/moderate hemoperitoneum -case discussed with Dr. Linda Morales        Elevated troponin -  Presenting troponin 0.19-trending down, no chest pain -  Echo: ef wnl and and The left ventricular wall motion is normal.      Anemia   Respond to blood transfusion    hemoperitoneum : so far stable from ct scan -Dr. Asad Philippe discussed with IR Hui romeo      Subjective:  no abdomen pain but still with abdominal distension     Dr. Leo Shaw and Dr. Asad Philippe discussed  with pt and sister again about the benefit of possible finding the bleeding site per cta and the risk of end of hemodialysis from iv contrast -if we need urgent cta . Today he had no questions and understands everything     sk. Disposition :tbd,   Review of systems:    General: No fevers or chills. Cardiovascular: No chest pain or pressure. No palpitations. Pulmonary: No shortness of breath. Gastrointestinal: No nausea, vomiting. Vital signs/Intake and Output:  Visit Vitals  /66 (BP 1 Location: Left arm)   Pulse 97   Temp 98.9 °F (37.2 °C)   Resp 14   Ht 6' (1.829 m)   Wt 108.9 kg (240 lb)   SpO2 100%   BMI 32.55 kg/m²     Current Shift:  No intake/output data recorded. Last three shifts:  12/21 1901 - 12/23 0700  In: 340 [P.O.:340]  Out: 3200 [Urine:3200]    Physical Exam:  General: WD, WN. Alert, cooperative, no acute distress    HEENT: NC, Atraumatic. PERRLA, anicteric sclerae. Lungs: CTA Bilaterally. No Wheezing/Rhonchi/Rales. Heart:  Regular  rhythm,  No murmur, No Rubs, No Gallops  Abdomen: Soft, +distended, Non tender. +Bowel sounds,   Extremities: No c/c/e  Psych:   Not anxious or agitated. Neurologic:  No acute neurological deficit.              Labs: Results:       Chemistry Recent Labs     12/23/20  0020 12/22/20  0130 12/21/20  0550   * 120* 115*    143 140   K 4.7 4.9 4.7    113* 108   CO2 27 24 24   BUN 40* 61* 65*   CREA 1.61* 2.26* 3.95*   CA 8.6 8.1* 7.7*  7.4*   AGAP 5 6 8   BUCR 25* 27* 16   ALB 2.8* 2.7* 2.8*      CBC w/Diff Recent Labs     12/23/20  0020 12/22/20  2215 12/22/20  1830 12/22/20  0130 12/22/20  0130 12/21/20  0550 12/21/20  0550   WBC 9.3  --   --   --  10.8  --  11.6   RBC 2.76*  --   --   --  2.55*  --  2.42*   HGB 7.8* 7.5* 8.0*   < > 7.4*   < > 6.9*   HCT 23.9* 23.2* 24.4*   < > 22.0* < > 20.8*     --   --   --  175  --  185   GRANS 65  --   --   --  73  --  79*   LYMPH 22  --   --   --  17*  --  12*   EOS 2  --   --   --  1  --  0    < > = values in this interval not displayed. Cardiac Enzymes Recent Labs     12/20/20  1237      CKND1 1.9      Coagulation Recent Labs     12/20/20  1237   PTP 14.4   INR 1.1   APTT 28.6       Lipid Panel No results found for: CHOL, CHOLPOCT, CHOLX, CHLST, CHOLV, 571934, HDL, HDLP, LDL, LDLC, DLDLP, 567879, VLDLC, VLDL, TGLX, TRIGL, TRIGP, TGLPOCT, CHHD, CHHDX   BNP No results for input(s): BNPP in the last 72 hours. Liver Enzymes Recent Labs     12/23/20  0020   ALB 2.8*      Thyroid Studies No results found for: T4, T3U, TSH, TSHEXT, TSHEXT     Procedures/imaging: see electronic medical records for all procedures/Xrays and details which were not copied into this note but were reviewed prior to creation of Plan    Nm Lung Vent/perf    Result Date: 12/19/2020  History: Pulmonary embolus. Dose: 6.6 mCi 99 technetium MAA with left antecubital fossa injection site. This is a perfusion only study. There is no chest x-ray for comparison. The distribution of the radiotracer with the 8 different views post injection of the perfusion imaging material demonstrates physiologic distribution. .     IMPRESSION: Normal ventilation only lung scan    Xr Wrist Rt Ap/lat/obl Min 3v    Result Date: 12/18/2020  History: Swelling. No trauma. COMPARISON: None. 3 views of the right wrist demonstrate degenerative joint space narrowing with osteophyte formation at the first metacarpal/carpal joint. This appears moderately severe. No acute fracture. No dislocation. Bone density normal. There may be some soft tissue swelling dorsum of the hand and wrist. This is nonspecific. IMPRESSION: 1. 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 No fracture.     Ct Head Wo Cont    Result Date: 12/18/2020  EXAM: CT HEAD, WITHOUT IV CONTRAST COMPARISON: None INDICATION: Possible seizure TECHNIQUE: Multiple axial CT images of the head were obtained extending from the skull base through the vertex. Additional coronal and sagittal reformations were also performed. One or more dose reduction techniques were used on this CT: automated exposure control, adjustment of the mAs and/or kVp according to patient size, and iterative reconstruction techniques. The specific techniques used on this CT exam have been documented in the patient's electronic medical record. Digital Imaging and Communications in Medicine (DICOM) format image data are available to nonaffiliated external healthcare facilities or entities on a secure, media free, reciprocally searchable basis with patient authorization for at least a 12-month period after this study. _______________ FINDINGS: BRAIN AND POSTERIOR FOSSA: The sulci, folia, ventricles and basal cisterns are within normal limits for the patient's age. There is no intracranial hemorrhage, mass effect, or midline shift. There are no areas of abnormal parenchymal attenuation. EXTRA-AXIAL SPACES AND MENINGES: There are no abnormal extra-axial fluid collections. CALVARIUM: No acute osseous abnormality. OTHER: The visualized portions of the paranasal sinuses and mastoid air cells are clear. _______________     IMPRESSION: No CT evidence of an acute intracranial abnormality - in particular, no acute cortical infarct, hemorrhage, or mass effect. Ct Abd Pelv Wo Cont    Result Date: 12/20/2020  EXAM: CT of the abdomen and pelvis CLINICAL INDICATION/HISTORY: abdomen distended, dilcia r/o obstruction   > Additional: None. COMPARISON: None. > Reference Exam: None. TECHNIQUE: Axial CT imaging of the abdomen and pelvis was performed without intravenous contrast. Multiplanar reformats were generated.  One or more dose reduction techniques were used on this CT: automated exposure control, adjustment of the mAs and/or kVp according to patient size, and iterative reconstruction techniques. The specific techniques used on this CT exam have been documented in the patient's electronic medical record. Digital Imaging and Communications in Medicine (DICOM) format image data are available to nonaffiliated external healthcare facilities or entities on a secure, media free, reciprocally searchable basis with patient authorization for at least a 12-month period after this study. _______________ FINDINGS: LOWER CHEST: Small right pleural effusion and atelectasis with respiratory motion in the lung bases. Partially visualized bilateral gynecomastia. Low-attenuation of the blood pool suggesting anemia. LIVER, BILIARY: Diffuse low-attenuation of the liver compatible with steatosis. No biliary dilation. Gallbladder is unremarkable. PANCREAS: Normal. SPLEEN: Normal. ADRENALS: Normal. KIDNEYS/URETERS/BLADDER: Bell catheter in place within the urinary bladder. Several locules of gas in keeping with recent image mentation. Unenhanced kidneys are unremarkable. LYMPH NODES: No enlarged lymph nodes. GASTROINTESTINAL TRACT: Assessment is limited given lack of contrast. There is suggestion of abnormal wall thickening involving the duodenum. The stomach is partially distended and fluid-filled with an air-fluid level. In addition, there is liquid stool throughout the colon to the rectum, compatible with nonspecific diarrheal illness. PELVIC ORGANS: Small amount of hemoperitoneum extends into the pelvis. VASCULATURE: Unremarkable. BONES: No acute or aggressive osseous abnormalities identified. Degenerative changes are noted including bridging osteophyte formation about both SI joints. OTHER: High attenuating fluid along the inferior right hepatic lobe extending into the central mesentery is noted which measures 50-60 Hounsfield units compatible with hemoperitoneum. _______________     IMPRESSION: 1.   Mild to moderate volume of hemoperitoneum of uncertain etiology tracking along the inferior right hepatic lobe and into the central mesentery with a small amount of pelvic free fluid as well. 2. Inflammatory changes in the duodenum as well as distended and partially fluid-filled stomach and liquid stool throughout the colon may represent gastroenteritis and nonspecific diarrheal illness and may be related to the above-described pneumoperitoneum. Superimposed peptic ulcer disease not excluded. No evidence of pneumatosis or free air, however. Us Retroperitoneum Comp    Result Date: 12/20/2020  EXAM: Complete retroperitoneal ultrasound INDICATION: Pain. COMPARISON: None. _______________ FINDINGS: RIGHT KIDNEY: 9.9 cm in length. No hydronephrosis. No solid renal mass. No visible calculi. Normal renal echotexture and cortical thickness. LEFT KIDNEY: 11.4 cm in length. No hydronephrosis. No solid renal mass. No visible calculi. Normal renal echotexture and cortical thickness. BLADDER: Bladder is decompressed with a Bell catheter in place. OTHER: Prostate was not adequately visualized. _______________     IMPRESSION:  Unremarkable ultrasound of the kidneys. Bell catheter in place within the decompressed urinary bladder. Xr Chest Port    Result Date: 12/19/2020  History: Short of breath. COMPARISON: None. Portable chest x-ray performed. There is elevation of the right hemidiaphragm which is nonspecific. No focal infiltrate, pneumothorax or effusion. Heart size appears normal. Mediastinum appears normal. No skeletal abnormalities. IMPRESSION: 1. No acute pulmonary disease.

## 2020-12-23 NOTE — ROUTINE PROCESS
Bedside and Verbal shift change report given to [address] [place] [address] (oncoming nurse) by Felisha Carrillo RN (offgoing nurse). Report included the following information SBAR and Kardex.

## 2020-12-23 NOTE — PROGRESS NOTES
CALLED  For possible new blood in urine that was not present before  Reviewed admission urine which had blood  Will check urine analysis and culture

## 2020-12-23 NOTE — PROGRESS NOTES
Surgery Progress Note    Patient: Ali Hamman MRN: [id number]  CSN: [id number]    YOB: 1960  Age: 56 y.o. Sex: male    DOA: 12/19/2020 LOS:  LOS: 4 days          Chief Complaint:          Assessment/Plan       Transfused and seems to be stabilizing. Abd distended but no peritoneal signs. Pt more sleepy today. No surgical intervention required at this time. Signed out to Dr. Warner Wayne with the plan as stated below. Please call him if clinical status changes and localization completed with IR:  Discussed case at length with Dr. Kesha Booth IR and Dr. Génesis Bonds.  Also discussed case with pt and sister. Do not recommend exploratory laparotomy for unknown etiology while pt is stable and without peritoneal signs or abd pain. Plan for transfusion. If bleeding continues then risk of contrast is supported for localization and possible embolization prior to surgical intervention. All above are in agreement. Patient Active Problem List   Diagnosis Code    ARF (acute renal failure) ([facility] [address] N17.9    Hypotension I95.9    Hypoxia R09.02    Elevated troponin R77.8    Distended abdomen R14.0    Anemia D64.9    Hematoma T14. 8XXA       Subjective:    Feeling sleepy    Review of systems:    Constitutional: denies fevers, chills, myalgias  Respiratory: denies SOB, cough  Cardiovascular: denies chest pain, palpitations  Gastrointestinal: denies nausea, vomiting, diarrhea      Vital signs/Intake and Output:  Visit Vitals  /68   Pulse 100   Temp 98.1 °F (36.7 °C)   Resp 16   Ht 6' (1.829 m)   Wt 108.9 kg (240 lb)   SpO2 97%   BMI 32.55 kg/m²     Current Shift:  12/23 0701 - 12/23 1900  In: -   Out: 700 [Urine:700]  Last three shifts:  12/21 1901 - 12/23 0700  In: 340 [P.O.:340]  Out: 3200 [Urine:3200]    Exam:    General: Well developed, alert, NAD, OX3  Head/Neck: NCAT, supple, No masses, No lymphadenopathy  CVS:Regular rate and rhythm,   Lungs:Clear to auscultation bilaterally, no wheezes,  Abdomen: Soft, Nontender, ? Distention vs normal round abd, Normal Bowel sounds, No hepatomegaly  Extremities: No C/C/E, pulses palpable 2+  Skin:normal texture and turgor, no rashes, no lesions  Neuro:grossly normal , follows commands  Psych:appropriate                Labs: Results:       Chemistry Recent Labs     12/23/20  0020 12/22/20  0130 12/21/20  0550   * 120* 115*    143 140   K 4.7 4.9 4.7    113* 108   CO2 27 24 24   BUN 40* 61* 65*   CREA 1.61* 2.26* 3.95*   CA 8.6 8.1* 7.7*  7.4*   AGAP 5 6 8   BUCR 25* 27* 16   ALB 2.8* 2.7* 2.8*      CBC w/Diff Recent Labs     12/23/20  1030 12/23/20  0020 12/22/20  2215 12/22/20  0130 12/22/20  0130 12/21/20  0550 12/21/20  0550   WBC  --  9.3  --   --  10.8  --  11.6   RBC  --  2.76*  --   --  2.55*  --  2.42*   HGB 8.5* 7.8* 7.5*   < > 7.4*   < > 6.9*   HCT 26.2* 23.9* 23.2*   < > 22.0*   < > 20.8*   PLT  --  193  --   --  175  --  185   GRANS  --  65  --   --  73  --  79*   LYMPH  --  22  --   --  17*  --  12*   EOS  --  2  --   --  1  --  0    < > = values in this interval not displayed. Cardiac Enzymes Recent Labs     12/20/20  1237      CKND1 1.9      Coagulation Recent Labs     12/20/20  1237   PTP 14.4   INR 1.1   APTT 28.6       Lipid Panel No results found for: CHOL, CHOLPOCT, CHOLX, CHLST, CHOLV, 737066, HDL, HDLP, LDL, LDLC, DLDLP, [id number], VLDLC, VLDL, TGLX, TRIGL, TRIGP, TGLPOCT, CHHD, CHHDX   BNP No results for input(s): BNPP in the last 72 hours.    Liver Enzymes Recent Labs     12/23/20  0020   ALB 2.8*      Thyroid Studies No results found for: T4, T3U, TSH, TSHEXT, TSHEXT     Procedures/imaging: see electronic medical records for all procedures/Xrays and details which were not copied into this note but were reviewed prior to creation of [address] [place] [address]

## 2020-12-23 NOTE — PROGRESS NOTES
[facility] at this time. Patient alert and oriented x4. Denies SOB, chest pain. Shows no signs of distress. Patient lungs clear bilaterally. Cap refill < 3 sec to all extremities. Patient has 20 G IV to R [facility]. Stated pain 0/10 but abdomen is distended with tenderness to palpation. Damian in place draining ana urine with scant trace of blood. Bowel sounds present. Skin intact. Patient call light and possessions within reach. Bed locked and in lowest position. Nurse will continue to monitor. 5  Paged Dr. Tanna Collado. Family member told CNA the blood in urine worries her and that she noticed it yesterday. Primary nurse (Jammie SALMON) noticed scant tracings of the blood in her oncoming shift this afternoon. [address]  Dr. Tanna Collado to order urine culture/UA. 1930  Shift Summary  Patient alert and oriented x4. Patient had uneventful shift. Ambulating and voiding via damian sufficient amounts. No c/o pain.

## 2020-12-23 NOTE — PROGRESS NOTES
Problem: Falls - Risk of  Goal: *Absence of Falls  Description: Document Jeramie Eduardo Fall Risk and appropriate interventions in the flowsheet.   Outcome: Progressing Towards Goal  Note: Fall Risk Interventions:  Mobility Interventions: Assess mobility with egress test         Medication Interventions: Teach patient to arise slowly    Elimination Interventions: Call light in reach              Problem: Patient Education: Go to Patient Education Activity  Goal: Patient/Family Education  Outcome: Progressing Towards Goal     Problem: Pain  Goal: *Control of Pain  Outcome: Progressing Towards Goal  Goal: *PALLIATIVE CARE:  Alleviation of Pain  Outcome: Progressing Towards Goal     Problem: Patient Education: Go to Patient Education Activity  Goal: Patient/Family Education  Outcome: Progressing Towards Goal     Problem: Breathing Pattern - Ineffective  Goal: *Absence of hypoxia  Outcome: Progressing Towards Goal  Goal: *Use of effective breathing techniques  Outcome: Progressing Towards Goal  Goal: *PALLIATIVE CARE:  Alleviation of Dyspnea  Outcome: Progressing Towards Goal     Problem: Anemia Care Plan (Adult and Pediatric)  Goal: *Labs within defined limits  Outcome: Progressing Towards Goal  Goal: *Tolerates increased activity  Outcome: Progressing Towards Goal

## 2020-12-24 PROBLEM — R58 INTRA ABDOMINAL HEMORRHAGE: Status: ACTIVE | Noted: 2020-12-24

## 2020-12-24 LAB
ALBUMIN SERPL-MCNC: 2.5 G/DL (ref 3.4–5)
ANION GAP SERPL CALC-SCNC: 6 MMOL/L (ref 3–18)
BASOPHILS # BLD: 0 K/UL (ref 0–0.1)
BASOPHILS NFR BLD: 0 % (ref 0–2)
BUN SERPL-MCNC: 32 MG/DL (ref 7–18)
BUN/CREAT SERPL: 22 (ref 12–20)
CALCIUM SERPL-MCNC: 8.4 MG/DL (ref 8.5–10.1)
CHLORIDE SERPL-SCNC: 110 MMOL/L (ref 100–111)
CO2 SERPL-SCNC: 25 MMOL/L (ref 21–32)
CREAT SERPL-MCNC: 1.43 MG/DL (ref 0.6–1.3)
DIFFERENTIAL METHOD BLD: ABNORMAL
EOSINOPHIL # BLD: 0.1 K/UL (ref 0–0.4)
EOSINOPHIL NFR BLD: 1 % (ref 0–5)
ERYTHROCYTE [DISTWIDTH] IN BLOOD BY AUTOMATED COUNT: 15.1 % (ref 11.6–14.5)
GLUCOSE SERPL-MCNC: 114 MG/DL (ref 74–99)
HCT VFR BLD AUTO: 22.3 % (ref 36–48)
HCT VFR BLD AUTO: 26.1 % (ref 36–48)
HGB BLD-MCNC: 7.4 G/DL (ref 13–16)
HGB BLD-MCNC: 8.6 G/DL (ref 13–16)
LYMPHOCYTES # BLD: 1.8 K/UL (ref 0.9–3.6)
LYMPHOCYTES NFR BLD: 20 % (ref 21–52)
MAGNESIUM SERPL-MCNC: 2.2 MG/DL (ref 1.6–2.6)
MCH RBC QN AUTO: 28.5 PG (ref 24–34)
MCHC RBC AUTO-ENTMCNC: 33.2 G/DL (ref 31–37)
MCV RBC AUTO: 85.8 FL (ref 74–97)
MONOCYTES # BLD: 1.1 K/UL (ref 0.05–1.2)
MONOCYTES NFR BLD: 12 % (ref 3–10)
NEUTS SEG # BLD: 6 K/UL (ref 1.8–8)
NEUTS SEG NFR BLD: 67 % (ref 40–73)
PHOSPHATE SERPL-MCNC: 3.2 MG/DL (ref 2.5–4.9)
PLATELET # BLD AUTO: 207 K/UL (ref 135–420)
PMV BLD AUTO: 9.6 FL (ref 9.2–11.8)
POTASSIUM SERPL-SCNC: 4.5 MMOL/L (ref 3.5–5.5)
RBC # BLD AUTO: 2.6 M/UL (ref 4.7–5.5)
SODIUM SERPL-SCNC: 141 MMOL/L (ref 136–145)
WBC # BLD AUTO: 8.9 K/UL (ref 4.6–13.2)

## 2020-12-24 PROCEDURE — 74011250636 HC RX REV CODE- 250/636: Performed by: HOSPITALIST

## 2020-12-24 PROCEDURE — 80069 RENAL FUNCTION PANEL: CPT

## 2020-12-24 PROCEDURE — 85025 COMPLETE CBC W/AUTO DIFF WBC: CPT

## 2020-12-24 PROCEDURE — 36415 COLL VENOUS BLD VENIPUNCTURE: CPT

## 2020-12-24 PROCEDURE — 74011250637 HC RX REV CODE- 250/637: Performed by: HOSPITALIST

## 2020-12-24 PROCEDURE — 65660000000 HC RM CCU STEPDOWN

## 2020-12-24 PROCEDURE — 83735 ASSAY OF MAGNESIUM: CPT

## 2020-12-24 PROCEDURE — 85018 HEMOGLOBIN: CPT

## 2020-12-24 RX ADMIN — SODIUM CHLORIDE 75 ML/HR: 900 INJECTION, SOLUTION INTRAVENOUS at 07:44

## 2020-12-24 RX ADMIN — PANTOPRAZOLE SODIUM 40 MG: 40 TABLET, DELAYED RELEASE ORAL at 07:49

## 2020-12-24 RX ADMIN — Medication 10 ML: at 07:50

## 2020-12-24 RX ADMIN — Medication 10 ML: at 00:33

## 2020-12-24 NOTE — PROGRESS NOTES
Hospitalist Progress Note    Patient: Bertha Srivastava MRN: [id number]  [facility]: [id number]    YOB: 1960  Age: 56 y.o. Sex: male    DOA: 12/19/2020 LOS:  LOS: 5 days            Patient Active Problem List   Diagnosis Code    ARF (acute renal failure) ([facility] [address] N17.9    Hypotension I95.9    Hypoxia R09.02    Elevated troponin R77.8    Distended abdomen R14.0    Anemia D64.9    Hematoma T14. 8XXA    Intra abdominal hemorrhage R58        IMPRESSION and Plan:    Bertha Srivastava is a 56 y.o. male with   Patient Active Problem List    Diagnosis Date Noted    Intra abdominal hemorrhage 12/24/2020    Hematoma 12/22/2020    Distended abdomen 12/20/2020    Anemia 12/20/2020    ARF (acute renal failure) ([facility] [address] 12/19/2020    Hypotension 12/19/2020    Hypoxia 12/19/2020    Elevated troponin 12/19/2020     Principal Problem:    Intra abdominal hemorrhage (12/24/2020)    Active Problems:    ARF (acute renal failure) ([facility] [address] (12/19/2020)      Hypotension (12/19/2020)      Elevated troponin (12/19/2020)      Distended abdomen (12/20/2020)      Anemia (12/20/2020)      Hematoma (12/22/2020)        Pt presented fatigue on admission, he was found Olivia -he did take nsaids for 2 dose last week for wrist pain given to him by urgent care, renal on board. He reported his abdomen had been distended and no abdomen pain. Ct scan hemoperitoneum-repeated ct stable.  cta hold due to olivia, surgeon on board who recomends  CTA and possible embolization through IR if hemoglobin drops         Acute renal failure - cr is improved somewhat     Hypotension  -- bp is stable     Hypoxia - Resolved        Distended abdomen /hematoma   Stable from ct scan -size mild improving   Abdomen exam has been benign, vitals remained stable    ct abdomen done: mild/moderate hemoperitoneum -case discussed with Dr. Yassine Pimentel         Elevated troponin -       Anemia   Respond to blood transfusion    hemoperitoneum : so far stable from ct scan -Dr. Yassine Pimentel discussed with IR Ji romeo    D/w Dr. Nash Cedeno --->  Continue to monitor and obtain stat CTA if hypotensive    Patient's condition is fair        Recommend to continue hospitalization. Discussed with patient. Chief Complaints:   Chief Complaint   Patient presents with    Fatigue     SUBJECTIVE:  Pt is seen and examined. resting comfortably          Review of systems:    Review of Systems   Constitutional: Positive for malaise/fatigue. HENT: Negative. Eyes: Negative. Respiratory: Positive for shortness of breath. Cardiovascular: Positive for leg swelling. Negative for chest pain, palpitations and orthopnea. Gastrointestinal: Negative. Negative for abdominal pain, diarrhea and heartburn. Genitourinary: Negative for dysuria and hematuria. Skin: Negative. Neurological: Positive for weakness. Psychiatric/Behavioral: Negative for depression, substance abuse and suicidal ideas. The patient is not nervous/anxious. PE:  Patient Vitals for the past 24 hrs:   BP Temp Pulse Resp SpO2   12/24/20 1128 132/66 99.4 °F (37.4 °C) 96 18 100 %   12/24/20 0754 (!) 144/77 97.5 °F (36.4 °C) 99 15 97 %   12/24/20 0316 129/69 99.2 °F (37.3 °C) 100 18 95 %   12/24/20 0006 (!) 143/81 97.5 °F (36.4 °C) (!) 106 16 97 %   12/23/20 1956 128/73 99.9 °F (37.7 °C) 98 16 95 %   12/23/20 1708 (!) 131/96 98.2 °F (36.8 °C) (!) 109 16 100 %       Intake/Output Summary (Last 24 hours) at 12/24/2020 1244  Last data filed at 12/24/2020 1128  Gross per 24 hour   Intake 1170 ml   Output 3000 ml   Net -1830 ml     Patient Vitals for the past 120 hrs:   Weight   12/19/20 1923 108.9 kg (240 lb)   12/20/20 0905 108.9 kg (240 lb)         Physical Exam  Vitals signs and nursing note reviewed. Constitutional:       General: He is in acute distress. Neck:      Musculoskeletal: Normal range of motion and neck supple. Vascular: No JVD. Cardiovascular:      Rate and Rhythm: Normal rate and regular rhythm.       Heart sounds: Normal heart sounds. Pulmonary:      Effort: Respiratory distress present. Breath sounds: Normal breath sounds. Abdominal:      General: Bowel sounds are normal. There is no distension. Palpations: Abdomen is soft. Tenderness: There is no abdominal tenderness. There is no rebound. Musculoskeletal: Normal range of motion. Skin:     General: Skin is warm and dry. Neurological:      Mental Status: He is alert and oriented to person, place, and time. Psychiatric:         Mood and Affect: Affect normal.             Intake and Output:  Current Shift:  12/24 0701 - 12/24 1900  In: -   Out: 650 [Urine:650]  Last three shifts:  12/22 1901 - 12/24 0700  In: 9014 [P.O.:420;  I.V.:950]  Out: 18 [Urine:3950]    Lab/Data Reviewed:  Recent Results (from the past 8 hour(s))   MAGNESIUM    Collection Time: 12/24/20  5:18 AM   Result Value Ref Range    Magnesium 2.2 1.6 - 2.6 mg/dL   RENAL FUNCTION PANEL    Collection Time: 12/24/20  5:18 AM   Result Value Ref Range    Sodium 141 136 - 145 mmol/L    Potassium 4.5 3.5 - 5.5 mmol/L    Chloride 110 100 - 111 mmol/L    CO2 25 21 - 32 mmol/L    Anion gap 6 3.0 - 18 mmol/L    Glucose 114 (H) 74 - 99 mg/dL    BUN 32 (H) 7.0 - 18 MG/DL    Creatinine 1.43 (H) 0.6 - 1.3 MG/DL    BUN/Creatinine ratio 22 (H) 12 - 20      GFR est AA >60 >60 ml/min/1.73m2    GFR est non-AA 50 (L) >60 ml/min/1.73m2    Calcium 8.4 (L) 8.5 - 10.1 MG/DL    Phosphorus 3.2 2.5 - 4.9 MG/DL    Albumin 2.5 (L) 3.4 - 5.0 g/dL   CBC WITH AUTOMATED DIFF    Collection Time: 12/24/20  5:18 AM   Result Value Ref Range    WBC 8.9 4.6 - 13.2 K/uL    RBC 2.60 (L) 4.70 - 5.50 M/uL    HGB 7.4 (L) 13.0 - 16.0 g/dL    HCT 22.3 (L) 36.0 - 48.0 %    MCV 85.8 74.0 - 97.0 FL    MCH 28.5 24.0 - 34.0 PG    MCHC 33.2 31.0 - 37.0 g/dL    RDW 15.1 (H) 11.6 - 14.5 %    PLATELET 637 817 - 261 K/uL    MPV 9.6 9.2 - 11.8 FL    NEUTROPHILS 67 40 - 73 %    LYMPHOCYTES 20 (L) 21 - 52 %    MONOCYTES 12 (H) 3 - 10 % EOSINOPHILS 1 0 - 5 %    BASOPHILS 0 0 - 2 %    ABS. NEUTROPHILS 6.0 1.8 - 8.0 K/UL    ABS. LYMPHOCYTES 1.8 0.9 - 3.6 K/UL    ABS. MONOCYTES 1.1 0.05 - 1.2 K/UL    ABS. EOSINOPHILS 0.1 0.0 - 0.4 K/UL    ABS.  BASOPHILS 0.0 0.0 - 0.1 K/UL    DF AUTOMATED       Medications:  Current Facility-Administered Medications   Medication Dose Route Frequency    0.9% sodium chloride infusion 250 mL  250 mL IntraVENous PRN    0.9% sodium chloride infusion  75 mL/hr IntraVENous CONTINUOUS    pantoprazole (PROTONIX) tablet 40 mg  40 mg Oral ACB    sodium chloride (NS) flush 5-40 mL  5-40 mL IntraVENous Q8H    sodium chloride (NS) flush 5-40 mL  5-40 mL IntraVENous PRN    [Held by provider] heparin (porcine) injection 5,000 Units  5,000 Units SubCUTAneous Q8H       Recent Results (from the past 24 hour(s))   HGB & HCT    Collection Time: 12/23/20  6:20 PM   Result Value Ref Range    HGB 8.1 (L) 13.0 - 16.0 g/dL    HCT 24.7 (L) 36.0 - 48.0 %   URINALYSIS W/ RFLX MICROSCOPIC    Collection Time: 12/23/20  7:10 PM   Result Value Ref Range    Color YELLOW      Appearance CLOUDY      Specific gravity 1.018 1.005 - 1.030      pH (UA) 5.0 5.0 - 8.0      Protein 100 (A) NEG mg/dL    Glucose 100 (A) NEG mg/dL    Ketone Negative NEG mg/dL    Bilirubin Negative NEG      Blood LARGE (A) NEG      Urobilinogen 1.0 0.2 - 1.0 EU/dL    Nitrites Negative NEG      Leukocyte Esterase TRACE (A) NEG     URINE MICROSCOPIC ONLY    Collection Time: 12/23/20  7:10 PM   Result Value Ref Range    WBC 4 to 10 0 - 5 /hpf    RBC >100 (H) 0 - 5 /hpf    Epithelial cells FEW 0 - 5 /lpf    Bacteria FEW (A) NEG /hpf    Hyaline cast 0 to 3 0 - 2 /lpf   MAGNESIUM    Collection Time: 12/24/20  5:18 AM   Result Value Ref Range    Magnesium 2.2 1.6 - 2.6 mg/dL   RENAL FUNCTION PANEL    Collection Time: 12/24/20  5:18 AM   Result Value Ref Range    Sodium 141 136 - 145 mmol/L    Potassium 4.5 3.5 - 5.5 mmol/L    Chloride 110 100 - 111 mmol/L    CO2 25 21 - 32 mmol/L Anion gap 6 3.0 - 18 mmol/L    Glucose 114 (H) 74 - 99 mg/dL    BUN 32 (H) 7.0 - 18 MG/DL    Creatinine 1.43 (H) 0.6 - 1.3 MG/DL    BUN/Creatinine ratio 22 (H) 12 - 20      GFR est AA >60 >60 ml/min/1.73m2    GFR est non-AA 50 (L) >60 ml/min/1.73m2    Calcium 8.4 (L) 8.5 - 10.1 MG/DL    Phosphorus 3.2 2.5 - 4.9 MG/DL    Albumin 2.5 (L) 3.4 - 5.0 g/dL   CBC WITH AUTOMATED DIFF    Collection Time: 12/24/20  5:18 AM   Result Value Ref Range    WBC 8.9 4.6 - 13.2 K/uL    RBC 2.60 (L) 4.70 - 5.50 M/uL    HGB 7.4 (L) 13.0 - 16.0 g/dL    HCT 22.3 (L) 36.0 - 48.0 %    MCV 85.8 74.0 - 97.0 FL    MCH 28.5 24.0 - 34.0 PG    MCHC 33.2 31.0 - 37.0 g/dL    RDW 15.1 (H) 11.6 - 14.5 %    PLATELET 755 245 - 893 K/uL    MPV 9.6 9.2 - 11.8 FL    NEUTROPHILS 67 40 - 73 %    LYMPHOCYTES 20 (L) 21 - 52 %    MONOCYTES 12 (H) 3 - 10 %    EOSINOPHILS 1 0 - 5 %    BASOPHILS 0 0 - 2 %    ABS. NEUTROPHILS 6.0 1.8 - 8.0 K/UL    ABS. LYMPHOCYTES 1.8 0.9 - 3.6 K/UL    ABS. MONOCYTES 1.1 0.05 - 1.2 K/UL    ABS. EOSINOPHILS 0.1 0.0 - 0.4 K/UL    ABS.  BASOPHILS 0.0 0.0 - 0.1 K/UL    DF AUTOMATED         Procedures/imaging: see electronic medical records for all procedures/Xrays and details which were not copied into this note but were reviewed prior to creation of Plan    Carey Banerjee MD   12/24/2020, 12:44 PM

## 2020-12-24 NOTE — ROUTINE PROCESS
Bedside and Verbal shift change report given to Agustin Miller RN (oncoming nurse) by Mikala Mays RN (offgoing nurse). Report included the following information SBAR and Kardex.

## 2020-12-24 NOTE — ROUTINE PROCESS
1946  Bedside and verbal shift change report given to Enrique 14 by Armando Lopez RN. Report included SBAR, kardex, MAR, and recent results.

## 2020-12-24 NOTE — PROGRESS NOTES
1000  Assumed care of pt at this time. Assessment complete. Pt alert and oriented x 4. Shows no sign of distress. Fall risk arm band in place. Denies SOB and chest pain. Pt lungs clear bilaterally. Cap refill  less than 3 seconds. Pt denies numbness and tingling to all extremities. Stated pain 0/10. Pt has 20 G IV to R AC. Pt has no dressing . Scds in place Incentive spirometer at bedside. Pt encouraged to continue use of IS. Pt verbalized understanding. Call light and possessions within reach. Bed locked and in low position. Will continue to monitor. Shift summary  Pt is alert and oriented x 4. Pt had uneventful shift. Pt ambulating and voiding sufficient amounts. Pain to be controlled by PRN medication. No bleeding noted this shift.  H&H 8.6 and 26.1

## 2020-12-24 NOTE — ROUTINE PROCESS
Bedside and Verbal shift change report given to Kristi Ellis RN(oncoming nurse) by Paulino Killian RN (offgoing nurse).  Report included the following information SBAR, Kardex, Intake/Output, MAR, and Cardiac Rhythm ST.

## 2020-12-24 NOTE — PROGRESS NOTES
Nephrology Progress note    Subjective:     Rosa Morales is a 56 y.o. male with past medical history significant for HTN who presented to the ED with Seizure, new onset, weakness and decreased appetite/intake. Patient noted to be Hypotensive SBP 96/53 mildly tachycardic and diaphoretic. Labs revealed a S Cr 5.6 and repeat this a.m. up to 6.1. CPK was 220 and repeat today 100. Patient had previously c/o Right wrist pains at the Urgent care center and given NSAID indocin. He stated that he only took 2 doses. Denies any other NSAID use. Denied illicit drug use. No recent Skin rash. No h/o lupus, Rheumatoid arthritis. Bell catheter this a.m. only yeilded ~ 100ml urine. CT scan of head was unremarkable. V/Q scan normal. Cr 6.1=>3.9. Denies abd pain, Hg dropped 8=>6.9    CT abd-1. Mild to moderate volume of hemoperitoneum of uncertain etiology tracking  along the inferior right hepatic lobe and into the central mesentery with a  small amount of pelvic free fluid as well. Unremarkable kidneys  2. Inflammatory changes in the duodenum as well as distended and partially  fluid-filled stomach and liquid stool throughout the colon may represent  gastroenteritis and nonspecific diarrheal illness and may be related to the  above-described pneumoperitoneum. Superimposed peptic ulcer disease not  excluded. No evidence of pneumatosis or free air, however. Stable this AM, sitting up in chair. Cr down to 2.2, then 1.4, UO over 1.8L yesterday.     Admit Date: 12/19/2020      Allergy:  Allergies   Allergen Reactions    Influenza Virus Vaccines Anaphylaxis        Objective:     Visit Vitals  /66 (BP 1 Location: Left arm, BP Patient Position: At rest;Supine)   Pulse 96   Temp 99.4 °F (37.4 °C)   Resp 18   Ht 6' (1.829 m)   Wt 108.9 kg (240 lb)   SpO2 100%   BMI 32.55 kg/m²         Intake/Output Summary (Last 24 hours) at 12/24/2020 1407  Last data filed at 12/24/2020 1128  Gross per 24 hour   Intake 1170 ml   Output 3000 ml   Net -1830 ml       Physical Exam:       General: No acute distress   HENT: Atraumatic and normocephalic   Eyes: Normal conjunctiva   Neck: Supple with no JVD   Cardiovascular: Normal S1 & S2, no m/r/g   Pulmonary/Chest Wall: Clear to auscultation bilaterally   Abdominal: Soft and +NABS   Musculoskeletal: no edema   Neurological: No focal deficits       Data Review:    Lab Results   Component Value Date/Time    WBC 8.9 12/24/2020 05:18 AM    RBC 2.60 (L) 12/24/2020 05:18 AM    HCT 26.1 (L) 12/24/2020 01:29 PM    MCV 85.8 12/24/2020 05:18 AM    MCH 28.5 12/24/2020 05:18 AM    MCHC 33.2 12/24/2020 05:18 AM    RDW 15.1 (H) 12/24/2020 05:18 AM      Lab Results   Component Value Date    IRON 14 (L) 12/21/2020   No components found for: FERRITIN  No components found for: PTHINT  Urinalysis  No results found for: UGLU          Impression:     1. Acute Kidney Injury, most likely ATN sec to NSAID + Intravascular volume depletion. Patient was also on ACEI, Lisinopril which may be contributory in setting of dehydration and NSAID use. Hyaline casts noted on U/A. Also Possible interstitial Nephritis. Poppy 37. Cr down to 2.2 then 1.4 now. Hep B and C neg, SPEP neg, complements normal  2. Proteinuria, Poss  tubulointerstitial from ATN. Possible underlying Hypertensive Nephrosclerosis. ? Underlying glomerulonephritis. 3. Anemia  4. Hypotension  5. H/o HTN  6. Arthalgia R wrist, possible gout  7.  Anemia- r/o GIB    Plan:   ?DC damian  Cont IV fluid  Pending serologies: JAVIER,  ANCA   Avoid NSAIDs, IV radiocontrast  Will sign off, can f/u outpt if needed    MD Tristan Hansennison  [phone]

## 2020-12-24 NOTE — ROUTINE PROCESS
Bedside and Verbal shift change report given to Conner Crooks RN (oncoming nurse) by Juan Willis RN (offgoing nurse). Report included the following information SBAR and Kardex.

## 2020-12-24 NOTE — PROGRESS NOTES
2332-Resting in bed. White board updated. Call light within reach. Stable. 0011-Assessment complete. See shift assessment for details. 0236-Resting quietly in bed, eyes closed, respirations 16.    0553-Ambulated to bathroom. Stable, positive bowel movement. 0605-Returned to bed. Stable. Shift Summary:  Uneventful shift. Rested quietly. Stable at shift change report.

## 2020-12-25 LAB
ALBUMIN SERPL-MCNC: 2.5 G/DL (ref 3.4–5)
ANION GAP SERPL CALC-SCNC: 6 MMOL/L (ref 3–18)
BACTERIA SPEC CULT: ABNORMAL
BUN SERPL-MCNC: 29 MG/DL (ref 7–18)
BUN/CREAT SERPL: 23 (ref 12–20)
CALCIUM SERPL-MCNC: 8.4 MG/DL (ref 8.5–10.1)
CC UR VC: ABNORMAL
CHLORIDE SERPL-SCNC: 110 MMOL/L (ref 100–111)
CO2 SERPL-SCNC: 25 MMOL/L (ref 21–32)
CREAT SERPL-MCNC: 1.27 MG/DL (ref 0.6–1.3)
GLUCOSE SERPL-MCNC: 105 MG/DL (ref 74–99)
HCT VFR BLD AUTO: 23.5 % (ref 36–48)
HCT VFR BLD AUTO: 24.6 % (ref 36–48)
HGB BLD-MCNC: 7.8 G/DL (ref 13–16)
HGB BLD-MCNC: 8.2 G/DL (ref 13–16)
MAGNESIUM SERPL-MCNC: 2.2 MG/DL (ref 1.6–2.6)
PHOSPHATE SERPL-MCNC: 3.3 MG/DL (ref 2.5–4.9)
POTASSIUM SERPL-SCNC: 4.5 MMOL/L (ref 3.5–5.5)
SERVICE CMNT-IMP: ABNORMAL
SODIUM SERPL-SCNC: 141 MMOL/L (ref 136–145)

## 2020-12-25 PROCEDURE — 85018 HEMOGLOBIN: CPT

## 2020-12-25 PROCEDURE — 97116 GAIT TRAINING THERAPY: CPT

## 2020-12-25 PROCEDURE — 36415 COLL VENOUS BLD VENIPUNCTURE: CPT

## 2020-12-25 PROCEDURE — 74011250637 HC RX REV CODE- 250/637: Performed by: HOSPITALIST

## 2020-12-25 PROCEDURE — 97161 PT EVAL LOW COMPLEX 20 MIN: CPT

## 2020-12-25 PROCEDURE — 65660000000 HC RM CCU STEPDOWN

## 2020-12-25 PROCEDURE — 83735 ASSAY OF MAGNESIUM: CPT

## 2020-12-25 PROCEDURE — 80069 RENAL FUNCTION PANEL: CPT

## 2020-12-25 RX ADMIN — Medication 10 ML: at 16:00

## 2020-12-25 RX ADMIN — Medication 10 ML: at 10:07

## 2020-12-25 RX ADMIN — PANTOPRAZOLE SODIUM 40 MG: 40 TABLET, DELAYED RELEASE ORAL at 06:55

## 2020-12-25 NOTE — PROGRESS NOTES
Seems stable  Exam benign    No history of trauma, would be concerned about an intra abdominal aneurysm. May still need a CTA at some point to evaluate or screen,  Recommend a vascular consult for input before D/C.

## 2020-12-25 NOTE — PROGRESS NOTES
1915  Assumed care of pt  2018  Shift assessment complete  Pt alert and oriented   Pt resting with eyes open and chest rising and falling evenly   2359  Reassessment complete  0459  Reassessment complete    3 [place] [facility] Night Shift Chart Audit    Chart Audit completed? YES        Bedside and Verbal shift change report given to Kath Padilla (oncoming nurse) by Luis Carlos Upton RN (offgoing nurse). Report included the following information SBAR, Kardex, ED Summary, Intake/Output, MAR, Recent Results, Med Rec Status and Cardiac Rhythm NSR.

## 2020-12-25 NOTE — PROGRESS NOTES
0730: Bedside and Verbal shift change report given to [address] (oncoming nurse) by Ana Torres RN (offgoing nurse). Report included the following information SBAR and Kardex. 0900: MD Sherrill Rodriguez visit noted with pt, tx plan reviewed with pt post assessment, pt acknowledges and agrees to plan, continue to monitor pt for any change  Josr Martinez RN    1000: MD Fan at the bedside discussing tx plan with pt and condition, continue to monitor pt for any change  Josr Martinez RN    1900: report given to PATRICIA Reese RN

## 2020-12-25 NOTE — PROGRESS NOTES
Problem: Falls - Risk of  Goal: *Absence of Falls  Description: Document [place] Flow Fall Risk and appropriate interventions in the flowsheet.   Outcome: Progressing Towards Goal  Note: Fall Risk Interventions:  Mobility Interventions: Bed/chair exit alarm, Communicate number of staff needed for ambulation/transfer, OT consult for ADLs, Patient to call before getting OOB, PT Consult for mobility concerns, PT Consult for assist device competence, Strengthening exercises (ROM-active/passive), Utilize walker, cane, or other assistive device         Medication Interventions: Evaluate medications/consider consulting pharmacy, Patient to call before getting OOB, Teach patient to arise slowly    Elimination Interventions: Call light in reach, Elevated toilet seat, Patient to call for help with toileting needs, Stay With Me (per policy), Toilet paper/wipes in reach, Toileting schedule/hourly rounds              Problem: Patient Education: Go to Patient Education Activity  Goal: Patient/Family Education  Outcome: Progressing Towards Goal     Problem: Pain  Goal: *Control of Pain  Outcome: Progressing Towards Goal  Goal: *PALLIATIVE CARE:  Alleviation of Pain  Outcome: Progressing Towards Goal     Problem: Patient Education: Go to Patient Education Activity  Goal: Patient/Family Education  Outcome: Progressing Towards Goal     Problem: Breathing Pattern - Ineffective  Goal: *Absence of hypoxia  Outcome: Progressing Towards Goal  Goal: *Use of effective breathing techniques  Outcome: Progressing Towards Goal  Goal: *PALLIATIVE CARE:  Alleviation of Dyspnea  Outcome: Progressing Towards Goal     Problem: Patient Education: Go to Patient Education Activity  Goal: Patient/Family Education  Outcome: Progressing Towards Goal     Problem: Anemia Care Plan (Adult and Pediatric)  Goal: *Labs within defined limits  Outcome: Progressing Towards Goal  Goal: *Tolerates increased activity  Outcome: Progressing Towards Goal

## 2020-12-25 NOTE — PROGRESS NOTES
Problem: Mobility Impaired (Adult and Pediatric)  Goal: *Acute Goals and Plan of Care (Insert Text)  Description: PT goals to be met in 1 day:  Pt will be able to perform supine<>sit Ind for transfers at home. Pt will be able to perform sit<>stand mod I for increased ability to transfer at home safely. Pt will be able to participate in gt training >100' w/ LRAD, GB and supervision for improved ability in home upon d/c. Pt will be educated regarding HEP per MD protocol for optimal AROM/strength outcomes. Outcome: Resolved/Met  Note:   PHYSICAL THERAPY EVALUATION & DISCHARGE    Patient: Bertha Srivastava (89 y.o. male)  Date: 12/25/2020  Primary Diagnosis: ARF (acute renal failure) (HCC) [N17.9]  Hypotension [I95.9]        Precautions: none       ASSESSMENT AND RECOMMENDATIONS:  RN ok's PT eval. Pt supine upon arrival. Agreeable to tx. Supine to sit at L EOB with [place]. Pt cued to scoot to EOB, feet on floor to improve sitting posture/balance. STS w/ no AD, GB, and progresses to mod I. Cues for foot/hand placement for optimal body mechanics and PLB technique as needed. Pt ambulates 120' with no AD, GB, and progresses to supervision. Cues provided for posture, PLB, and self-pacing. Pt demo's good balance; stands at toilet to urinate. No LOB. Pt declines stair training at this time reporting he feels good about the stairs at his home. With clinical judgement based on pt's gait activities, pt should be safe navigating stairs at home upon discharge. Pt returns to bed and sits at EOB for dinner. Pt educated on HEP including sit to stand movements, LAQ, AP, walking 2-3x/day. Pt educated on home safety and fall prevention. Pt left with all needs within reach. RN updated on pt progress. Further skilled inpatient physical therapy is not indicated at this time.   Discharge Recommendations: None  Further Equipment Recommendations for Discharge: N/A      SUBJECTIVE:   Patient stated I feel pretty good today, a bit tired.     OBJECTIVE DATA SUMMARY:   History reviewed. No pertinent past medical history. History reviewed. No pertinent surgical history. Barriers to Learning/Limitations: None  Compensate with: visual, verbal, tactile, kinesthetic cues/model  Prior Level of Function/Home Situation:   Home Situation  Home Environment: Private residence  # Steps to Enter: 1  Rails to Enter: No  Wheelchair Ramp: No  One/Two Story Residence: Two story  # of Interior Steps: 10  Interior Rails: Both  Living Alone: No  Support Systems: Family member(s)(Lives with his sister)  Patient Expects to be Discharged to[de-identified] Private residence  Current DME Used/Available at Home: None  Tub or Shower Type: Tub/Shower combination  Critical Behavior:  Neurologic State: Alert; Appropriate for age  Orientation Level: Oriented X4  Cognition: Appropriate decision making  Safety/Judgement: Awareness of environment  Psychosocial  Patient Behaviors: Calm; Cooperative  Purposeful Interaction: Yes  Strength:    Strength: Within functional limits  Tone & Sensation:   Tone: Normal  Sensation: Intact  Range Of Motion:  AROM: Within functional limits  Functional Mobility:  Bed Mobility:  Supine to Sit: Modified independent  Scooting: Independent  Transfers:  Sit to Stand: Independent  Stand to Sit: Independent  Balance:   Sitting: Intact; Without support  Standing: Intact; Without support  Ambulation/Gait Training:  Distance (ft): 120 Feet (ft)  Assistive Device: Gait belt  Ambulation - Level of Assistance: Supervision  Gait Abnormalities: Altered arm swing  Base of Support: Narrowed  Speed/Nu: Slow  Step Length: Right shortened;Left shortened  Interventions: Safety awareness training;Verbal cues  Therapeutic Exercises:   HEP including sit to stand movements, LAQ, AP, walking 2-3x/day.   Pain:  Pain Scale 1: Numeric (0 - 10)  Pain Intensity 1: 0  Activity Tolerance:   Good  Please refer to the flowsheet for vital signs taken during this treatment. After treatment:   []         Patient left in no apparent distress sitting up in chair  [x]         Patient left in no apparent distress in bed  [x]         Call bell left within reach  [x]         Nursing notified  []         Caregiver present  []         Bed alarm activated    COMMUNICATION/EDUCATION:   [x]         Fall prevention education was provided and the patient/caregiver indicated understanding. [x]         Patient/family have participated as able in goal setting and plan of care. [x]         Patient/family agree to work toward stated goals and plan of care. []         Patient understands intent and goals of therapy, but is neutral about his/her participation. []         Patient is unable to participate in goal setting and plan of care.     Thank you for this referral.  Sarah Henderson, PT   Time Calculation: 24 mins

## 2020-12-25 NOTE — PROGRESS NOTES
Problem: Falls - Risk of  Goal: *Absence of Falls  Description: Document [place] Flow Fall Risk and appropriate interventions in the flowsheet.   Outcome: Progressing Towards Goal  Note: Fall Risk Interventions:  Mobility Interventions: Assess mobility with egress test         Medication Interventions: Teach patient to arise slowly    Elimination Interventions: Call light in reach              Problem: Patient Education: Go to Patient Education Activity  Goal: Patient/Family Education  Outcome: Progressing Towards Goal     Problem: Pain  Goal: *Control of Pain  Outcome: Progressing Towards Goal  Goal: *PALLIATIVE CARE:  Alleviation of Pain  Outcome: Progressing Towards Goal     Problem: Patient Education: Go to Patient Education Activity  Goal: Patient/Family Education  Outcome: Progressing Towards Goal     Problem: Breathing Pattern - Ineffective  Goal: *Absence of hypoxia  Outcome: Progressing Towards Goal  Goal: *Use of effective breathing techniques  Outcome: Progressing Towards Goal  Goal: *PALLIATIVE CARE:  Alleviation of Dyspnea  Outcome: Progressing Towards Goal     Problem: Patient Education: Go to Patient Education Activity  Goal: Patient/Family Education  Outcome: Progressing Towards Goal     Problem: Anemia Care Plan (Adult and Pediatric)  Goal: *Labs within defined limits  Outcome: Progressing Towards Goal  Goal: *Tolerates increased activity  Outcome: Progressing Towards Goal

## 2020-12-25 NOTE — PROGRESS NOTES
Hospitalist Progress Note    Patient: Vahe Meredith MRN: [id number]  CSN: [id number]    YOB: 1960  Age: 56 y.o. Sex: male    DOA: 12/19/2020 LOS:  LOS: 6 days            Patient Active Problem List   Diagnosis Code    ARF (acute renal failure) ([facility] [address] N17.9    Hypotension I95.9    Hypoxia R09.02    Elevated troponin R77.8    Distended abdomen R14.0    Anemia D64.9    Hematoma T14. 8XXA    Intra abdominal hemorrhage R58        IMPRESSION and Plan:    Vahe Meredith is a 56 y.o. male with   Patient Active Problem List    Diagnosis Date Noted    Intra abdominal hemorrhage 12/24/2020    Hematoma 12/22/2020    Distended abdomen 12/20/2020    Anemia 12/20/2020    ARF (acute renal failure) ([facility] [address] 12/19/2020    Hypotension 12/19/2020    Hypoxia 12/19/2020    Elevated troponin 12/19/2020     Principal Problem:    Intra abdominal hemorrhage (12/24/2020)    Active Problems:    ARF (acute renal failure) ([facility] [address] (12/19/2020)      Hypotension (12/19/2020)      Elevated troponin (12/19/2020)      Distended abdomen (12/20/2020)      Anemia (12/20/2020)      Hematoma (12/22/2020)        Pt presented fatigue on admission, he was found Olivia -he did take nsaids for 2 dose last week for wrist pain given to him by urgent care, renal on board. He reported his abdomen had been distended and no abdomen pain. Ct scan hemoperitoneum-repeated ct stable.  cta hold due to olivia, surgeon on board who recomends  CTA and possible embolization through IR if hemoglobin drops         Acute renal failure - cr is improved somewhat     Hypotension  -- bp is stable     Hypoxia - Resolved        Distended abdomen /hematoma   Stable from ct scan -size mild improving   Abdomen exam has been benign, vitals remained stable    ct abdomen done: mild/moderate hemoperitoneum -case discussed with Dr. Kaitlin Thompson         Elevated troponin -       Anemia   Respond to blood transfusion    hemoperitoneum : so far stable from ct scan -Dr. Kaitlin Thompson discussed with IR Marko romeo      Continue to monitor and obtain stat CTA if hypotensive    Dc damian    Ambulate   cr is improving    Fu labs as ordered    Consider obtaining CTA priro to dc  Patient's condition is fair        Recommend to continue hospitalization. Discussed with patient. Chief Complaints:   Chief Complaint   Patient presents with    Fatigue     SUBJECTIVE:  Pt is seen and examined. resting comfortably  Denies any pain         Review of systems:    Review of Systems   Constitutional: Positive for malaise/fatigue. HENT: Negative. Eyes: Negative. Respiratory: Positive for shortness of breath. Cardiovascular: Positive for leg swelling. Negative for chest pain, palpitations and orthopnea. Gastrointestinal: Negative. Negative for abdominal pain, diarrhea and heartburn. Genitourinary: Negative for dysuria and hematuria. Skin: Negative. Neurological: Positive for weakness. Psychiatric/Behavioral: Negative for depression, substance abuse and suicidal ideas. The patient is not nervous/anxious. PE:  Patient Vitals for the past 24 hrs:   BP Temp Pulse Resp SpO2 Weight   12/25/20 0811 132/74 98.4 °F (36.9 °C) 100 18 98 % --   12/25/20 0426 131/80 98.2 °F (36.8 °C) (!) 104 18 99 % 108.9 kg (240 lb)   12/24/20 2331 (!) 147/76 98.6 °F (37 °C) 98 18 98 % --   12/24/20 1949 (!) 143/78 98.2 °F (36.8 °C) (!) 103 18 96 % --   12/24/20 1552 (!) 148/75 98.8 °F (37.1 °C) 97 18 98 % --       Intake/Output Summary (Last 24 hours) at 12/25/2020 1215  Last data filed at 12/25/2020 2035  Gross per 24 hour   Intake --   Output 1950 ml   Net -1950 ml     Patient Vitals for the past 120 hrs:   Weight   12/25/20 0426 108.9 kg (240 lb)         Physical Exam  Vitals signs and nursing note reviewed. Constitutional:       General: He is in acute distress. Neck:      Musculoskeletal: Normal range of motion and neck supple. Vascular: No JVD.    Cardiovascular:      Rate and Rhythm: Normal rate and regular rhythm. Heart sounds: Normal heart sounds. Pulmonary:      Effort: Respiratory distress present. Breath sounds: Normal breath sounds. Abdominal:      General: Bowel sounds are normal. There is no distension. Palpations: Abdomen is soft. Tenderness: There is no abdominal tenderness. There is no rebound. Musculoskeletal: Normal range of motion. Skin:     General: Skin is warm and dry. Neurological:      Mental Status: He is alert and oriented to person, place, and time. Psychiatric:         Mood and Affect: Affect normal.             Intake and Output:  Current Shift:  No intake/output data recorded.   Last three shifts:  12/23 1901 - 12/25 0700  In: 950 [I.V.:950]  Out: 3650 [Urine:3650]    Lab/Data Reviewed:  Recent Results (from the past 8 hour(s))   HGB & HCT    Collection Time: 12/25/20 11:18 AM   Result Value Ref Range    HGB 8.2 (L) 13.0 - 16.0 g/dL    HCT 24.6 (L) 36.0 - 48.0 %     Medications:  Current Facility-Administered Medications   Medication Dose Route Frequency    0.9% sodium chloride infusion 250 mL  250 mL IntraVENous PRN    0.9% sodium chloride infusion  75 mL/hr IntraVENous CONTINUOUS    pantoprazole (PROTONIX) tablet 40 mg  40 mg Oral ACB    sodium chloride (NS) flush 5-40 mL  5-40 mL IntraVENous Q8H    sodium chloride (NS) flush 5-40 mL  5-40 mL IntraVENous PRN    [Held by provider] heparin (porcine) injection 5,000 Units  5,000 Units SubCUTAneous Q8H       Recent Results (from the past 24 hour(s))   HGB & HCT    Collection Time: 12/24/20  1:29 PM   Result Value Ref Range    HGB 8.6 (L) 13.0 - 16.0 g/dL    HCT 26.1 (L) 36.0 - 48.0 %   HGB & HCT    Collection Time: 12/25/20  3:35 AM   Result Value Ref Range    HGB 7.8 (L) 13.0 - 16.0 g/dL    HCT 23.5 (L) 36.0 - 48.0 %   MAGNESIUM    Collection Time: 12/25/20  3:35 AM   Result Value Ref Range    Magnesium 2.2 1.6 - 2.6 mg/dL   RENAL FUNCTION PANEL    Collection Time: 12/25/20  3:35 AM   Result Value Ref Range    Sodium 141 136 - 145 mmol/L    Potassium 4.5 3.5 - 5.5 mmol/L    Chloride 110 100 - 111 mmol/L    CO2 25 21 - 32 mmol/L    Anion gap 6 3.0 - 18 mmol/L    Glucose 105 (H) 74 - 99 mg/dL    BUN 29 (H) 7.0 - 18 MG/DL    Creatinine 1.27 0.6 - 1.3 MG/DL    BUN/Creatinine ratio 23 (H) 12 - 20      GFR est AA >60 >60 ml/min/1.73m2    GFR est non-AA 57 (L) >60 ml/min/1.73m2    Calcium 8.4 (L) 8.5 - 10.1 MG/DL    Phosphorus 3.3 2.5 - 4.9 MG/DL    Albumin 2.5 (L) 3.4 - 5.0 g/dL   HGB & HCT    Collection Time: 12/25/20 11:18 AM   Result Value Ref Range    HGB 8.2 (L) 13.0 - 16.0 g/dL    HCT 24.6 (L) 36.0 - 48.0 %       Procedures/imaging: see electronic medical records for all procedures/Xrays and details which were not copied into this note but were reviewed prior to creation of Betzy Solis MD   12/25/2020, 12:44 PM

## 2020-12-26 ENCOUNTER — APPOINTMENT (OUTPATIENT)
Dept: CT IMAGING | Age: 60
DRG: 682 | End: 2020-12-26
Attending: INTERNAL MEDICINE

## 2020-12-26 LAB
ALBUMIN SERPL-MCNC: 2.5 G/DL (ref 3.4–5)
ALBUMIN/GLOB SERPL: 0.7 {RATIO} (ref 0.8–1.7)
ALP SERPL-CCNC: 104 U/L (ref 45–117)
ALT SERPL-CCNC: 131 U/L (ref 16–61)
ANION GAP SERPL CALC-SCNC: 5 MMOL/L (ref 3–18)
AST SERPL-CCNC: 90 U/L (ref 10–38)
BASOPHILS # BLD: 0 K/UL (ref 0–0.1)
BASOPHILS NFR BLD: 0 % (ref 0–2)
BILIRUB SERPL-MCNC: 1.5 MG/DL (ref 0.2–1)
BUN SERPL-MCNC: 29 MG/DL (ref 7–18)
BUN/CREAT SERPL: 23 (ref 12–20)
CALCIUM SERPL-MCNC: 8.2 MG/DL (ref 8.5–10.1)
CHLORIDE SERPL-SCNC: 110 MMOL/L (ref 100–111)
CO2 SERPL-SCNC: 26 MMOL/L (ref 21–32)
CREAT SERPL-MCNC: 1.28 MG/DL (ref 0.6–1.3)
DIFFERENTIAL METHOD BLD: ABNORMAL
EOSINOPHIL # BLD: 0.2 K/UL (ref 0–0.4)
EOSINOPHIL NFR BLD: 2 % (ref 0–5)
ERYTHROCYTE [DISTWIDTH] IN BLOOD BY AUTOMATED COUNT: 15.2 % (ref 11.6–14.5)
GLOBULIN SER CALC-MCNC: 3.4 G/DL (ref 2–4)
GLUCOSE BLD STRIP.AUTO-MCNC: 105 MG/DL (ref 70–110)
GLUCOSE SERPL-MCNC: 108 MG/DL (ref 74–99)
HCT VFR BLD AUTO: 24.7 % (ref 36–48)
HCT VFR BLD AUTO: 27.1 % (ref 36–48)
HGB BLD-MCNC: 8.1 G/DL (ref 13–16)
HGB BLD-MCNC: 8.7 G/DL (ref 13–16)
LYMPHOCYTES # BLD: 1.9 K/UL (ref 0.9–3.6)
LYMPHOCYTES NFR BLD: 22 % (ref 21–52)
MAGNESIUM SERPL-MCNC: 2.1 MG/DL (ref 1.6–2.6)
MCH RBC QN AUTO: 28.5 PG (ref 24–34)
MCHC RBC AUTO-ENTMCNC: 32.8 G/DL (ref 31–37)
MCV RBC AUTO: 87 FL (ref 74–97)
MONOCYTES # BLD: 1 K/UL (ref 0.05–1.2)
MONOCYTES NFR BLD: 12 % (ref 3–10)
NEUTS SEG # BLD: 5.6 K/UL (ref 1.8–8)
NEUTS SEG NFR BLD: 64 % (ref 40–73)
PHOSPHATE SERPL-MCNC: 3.4 MG/DL (ref 2.5–4.9)
PLATELET # BLD AUTO: 235 K/UL (ref 135–420)
PMV BLD AUTO: 9.6 FL (ref 9.2–11.8)
POTASSIUM SERPL-SCNC: 4.3 MMOL/L (ref 3.5–5.5)
PROT SERPL-MCNC: 5.9 G/DL (ref 6.4–8.2)
RBC # BLD AUTO: 2.84 M/UL (ref 4.7–5.5)
SODIUM SERPL-SCNC: 141 MMOL/L (ref 136–145)
WBC # BLD AUTO: 8.7 K/UL (ref 4.6–13.2)

## 2020-12-26 PROCEDURE — 74011250637 HC RX REV CODE- 250/637: Performed by: HOSPITALIST

## 2020-12-26 PROCEDURE — 80053 COMPREHEN METABOLIC PANEL: CPT

## 2020-12-26 PROCEDURE — 85018 HEMOGLOBIN: CPT

## 2020-12-26 PROCEDURE — 84100 ASSAY OF PHOSPHORUS: CPT

## 2020-12-26 PROCEDURE — 83735 ASSAY OF MAGNESIUM: CPT

## 2020-12-26 PROCEDURE — 85025 COMPLETE CBC W/AUTO DIFF WBC: CPT

## 2020-12-26 PROCEDURE — 82962 GLUCOSE BLOOD TEST: CPT

## 2020-12-26 PROCEDURE — 74011000636 HC RX REV CODE- 636: Performed by: INTERNAL MEDICINE

## 2020-12-26 PROCEDURE — 74176 CT ABD & PELVIS W/O CONTRAST: CPT

## 2020-12-26 PROCEDURE — 65660000000 HC RM CCU STEPDOWN

## 2020-12-26 PROCEDURE — 36415 COLL VENOUS BLD VENIPUNCTURE: CPT

## 2020-12-26 RX ADMIN — DIATRIZOATE MEGLUMINE AND DIATRIZOATE SODIUM 30 ML: 660; 100 LIQUID ORAL; RECTAL at 12:46

## 2020-12-26 RX ADMIN — PANTOPRAZOLE SODIUM 40 MG: 40 TABLET, DELAYED RELEASE ORAL at 06:30

## 2020-12-26 RX ADMIN — Medication 10 ML: at 16:00

## 2020-12-26 RX ADMIN — Medication 10 ML: at 08:00

## 2020-12-26 NOTE — PROGRESS NOTES
0700 : Assumed care of patient from [address] [facility] : assessed patient, no complaints of pain or discomfort. Pt laying in bed.  1246 : pt given contrast for procedure, given instructions on administration. 1448 : patient transported down for procedure. 1455 : patient transported back to floor from procedure  1745 : patient up to bathroom, did not make it and urinated on the floor. Cleaned patient and floor, given new gown. Educated on using the Urinal.  1900 : Bedside shift change report given to Bruce Winter (oncoming nurse) by Shermon Gitelman RN (offgoing nurse). Report included the following information SBAR, Kardex, Intake/Output and MAR.

## 2020-12-26 NOTE — PROGRESS NOTES
Problem: Falls - Risk of  Goal: *Absence of Falls  Description: Document [place] Fall Risk and appropriate interventions in the flowsheet.   Outcome: Progressing Towards Goal  Note: Fall Risk Interventions:  Mobility Interventions: Communicate number of staff needed for ambulation/transfer, Patient to call before getting OOB         Medication Interventions: Assess postural VS orthostatic hypotension, Patient to call before getting OOB, Teach patient to arise slowly    Elimination Interventions: Call light in reach, Patient to call for help with toileting needs, Urinal in reach

## 2020-12-26 NOTE — PROGRESS NOTES
Hospitalist Progress Note    Patient: Alfred Cerna MRN: [id number]  CSN: [id number]    YOB: 1960  Age: 56 y.o. Sex: male    DOA: 12/19/2020 LOS:  LOS: 7 days            Patient Active Problem List   Diagnosis Code    ARF (acute renal failure) ([facility] [address] N17.9    Hypotension I95.9    Hypoxia R09.02    Elevated troponin R77.8    Distended abdomen R14.0    Anemia D64.9    Hematoma T14. 8XXA    Intra abdominal hemorrhage R58        IMPRESSION and Plan:    Alfred Cerna is a 56 y.o. male with   Patient Active Problem List    Diagnosis Date Noted    Intra abdominal hemorrhage 12/24/2020    Hematoma 12/22/2020    Distended abdomen 12/20/2020    Anemia 12/20/2020    ARF (acute renal failure) ([facility] [address] 12/19/2020    Hypotension 12/19/2020    Hypoxia 12/19/2020    Elevated troponin 12/19/2020     Principal Problem:    Intra abdominal hemorrhage (12/24/2020)    Active Problems:    ARF (acute renal failure) ([facility] [address] (12/19/2020)      Hypotension (12/19/2020)      Elevated troponin (12/19/2020)      Distended abdomen (12/20/2020)      Anemia (12/20/2020)      Hematoma (12/22/2020)        Pt presented fatigue on admission, he was found Olivia -he did take nsaids for 2 dose last week for wrist pain given to him by urgent care, renal on board. He reported his abdomen had been distended and no abdomen pain. Ct scan hemoperitoneum-repeated ct stable.  cta hold due to olivia, surgeon on board who recomends  CTA and possible embolization through IR if hemoglobin drops         Acute renal failure - cr is improved     Hypotension  -- bp is stable     Hypoxia - Resolved        Distended abdomen /hematoma   Stable from ct scan -size mild improving   Abdomen exam has been benign, vitals remained stable    ct abdomen done: mild/moderate hemoperitoneum -  Will repeat CT today to monitor size     Elevated troponin -       Anemia   Respond to blood transfusion    hemoperitoneum : so far stable from ct scan -Dr. Rebecca Zamudio discussed with IR Raymundo romeo        Continue to monitor and obtain stat CTA if hypotensive    Bell d/hannah    Ambulate   cr is improving    Fu labs as ordered    Consider obtaining CTA priro to dc per surg and consider vascular input  Patient's condition is fair        Recommend to continue hospitalization. Discussed with patient. Chief Complaints:   Chief Complaint   Patient presents with    Fatigue     SUBJECTIVE:  Pt is seen and examined. resting comfortably  Denies any pain         Review of systems:    Review of Systems   Constitutional: Positive for malaise/fatigue. HENT: Negative. Eyes: Negative. Respiratory: Positive for shortness of breath. Cardiovascular: Positive for leg swelling. Negative for chest pain, palpitations and orthopnea. Gastrointestinal: Negative. Negative for abdominal pain, diarrhea and heartburn. Genitourinary: Negative for dysuria and hematuria. Skin: Negative. Neurological: Positive for weakness. Psychiatric/Behavioral: Negative for depression, substance abuse and suicidal ideas. The patient is not nervous/anxious. PE:  Patient Vitals for the past 24 hrs:   BP Temp Pulse Resp SpO2   12/26/20 1121 120/70 97.4 °F (36.3 °C) 100 16 100 %   12/26/20 0650 128/70 99.4 °F (37.4 °C) 94 16 97 %   12/26/20 0414 138/74 98.5 °F (36.9 °C) 96 18 98 %   12/26/20 0007 132/73 98.5 °F (36.9 °C) (!) 103 18 97 %   12/25/20 2038 (!) 130/56 98.4 °F (36.9 °C) (!) 108 18 97 %   12/25/20 1751 132/80 98.4 °F (36.9 °C) 88 18 98 %   12/25/20 1217 130/76 98.6 °F (37 °C) 98 18 99 %       Intake/Output Summary (Last 24 hours) at 12/26/2020 1155  Last data filed at 12/26/2020 1037  Gross per 24 hour   Intake 1595.83 ml   Output 100 ml   Net 1495.83 ml     Patient Vitals for the past 120 hrs:   Weight   12/25/20 0426 108.9 kg (240 lb)         Physical Exam  Vitals signs and nursing note reviewed. Constitutional:       General: He is in acute distress.    Neck:      Musculoskeletal: Normal range of motion and neck supple.      Vascular: No JVD.   Cardiovascular:      Rate and Rhythm: Normal rate and regular rhythm.      Heart sounds: Normal heart sounds.   Pulmonary:      Effort: Respiratory distress present.      Breath sounds: Normal breath sounds.   Abdominal:      General: Bowel sounds are normal. There is no distension.      Palpations: Abdomen is soft.      Tenderness: There is no abdominal tenderness. There is no rebound.   Musculoskeletal: Normal range of motion.   Skin:     General: Skin is warm and dry.   Neurological:      Mental Status: He is alert and oriented to person, place, and time.   Psychiatric:         Mood and Affect: Affect normal.             Intake and Output:  Current Shift:  12/26 0701 - 12/26 1900  In: 240 [P.O.:240]  Out: -   Last three shifts:  12/24 1901 - 12/26 0700  In: 1355.8 [I.V.:1355.8]  Out: 1350 [Urine:1350]    Lab/Data Reviewed:  Recent Results (from the past 8 hour(s))   HGB & HCT    Collection Time: 12/26/20  9:51 AM   Result Value Ref Range    HGB 8.7 (L) 13.0 - 16.0 g/dL    HCT 27.1 (L) 36.0 - 48.0 %     Medications:  Current Facility-Administered Medications   Medication Dose Route Frequency   • 0.9% sodium chloride infusion 250 mL  250 mL IntraVENous PRN   • 0.9% sodium chloride infusion  25 mL/hr IntraVENous CONTINUOUS   • pantoprazole (PROTONIX) tablet 40 mg  40 mg Oral ACB   • sodium chloride (NS) flush 5-40 mL  5-40 mL IntraVENous Q8H   • sodium chloride (NS) flush 5-40 mL  5-40 mL IntraVENous PRN   • [Held by provider] heparin (porcine) injection 5,000 Units  5,000 Units SubCUTAneous Q8H       Recent Results (from the past 24 hour(s))   MAGNESIUM    Collection Time: 12/26/20  1:30 AM   Result Value Ref Range    Magnesium 2.1 1.6 - 2.6 mg/dL   CBC WITH AUTOMATED DIFF    Collection Time: 12/26/20  1:30 AM   Result Value Ref Range    WBC 8.7 4.6 - 13.2 K/uL    RBC 2.84 (L) 4.70 - 5.50 M/uL    HGB 8.1 (L) 13.0 - 16.0 g/dL    HCT 24.7 (L) 36.0 -  48.0 %    MCV 87.0 74.0 - 97.0 FL    MCH 28.5 24.0 - 34.0 PG    MCHC 32.8 31.0 - 37.0 g/dL    RDW 15.2 (H) 11.6 - 14.5 %    PLATELET 483 528 - 598 K/uL    MPV 9.6 9.2 - 11.8 FL    NEUTROPHILS 64 40 - 73 %    LYMPHOCYTES 22 21 - 52 %    MONOCYTES 12 (H) 3 - 10 %    EOSINOPHILS 2 0 - 5 %    BASOPHILS 0 0 - 2 %    ABS. NEUTROPHILS 5.6 1.8 - 8.0 K/UL    ABS. LYMPHOCYTES 1.9 0.9 - 3.6 K/UL    ABS. MONOCYTES 1.0 0.05 - 1.2 K/UL    ABS. EOSINOPHILS 0.2 0.0 - 0.4 K/UL    ABS. BASOPHILS 0.0 0.0 - 0.1 K/UL    DF AUTOMATED     METABOLIC PANEL, COMPREHENSIVE    Collection Time: 12/26/20  1:30 AM   Result Value Ref Range    Sodium 141 136 - 145 mmol/L    Potassium 4.3 3.5 - 5.5 mmol/L    Chloride 110 100 - 111 mmol/L    CO2 26 21 - 32 mmol/L    Anion gap 5 3.0 - 18 mmol/L    Glucose 108 (H) 74 - 99 mg/dL    BUN 29 (H) 7.0 - 18 MG/DL    Creatinine 1.28 0.6 - 1.3 MG/DL    BUN/Creatinine ratio 23 (H) 12 - 20      GFR est AA >60 >60 ml/min/1.73m2    GFR est non-AA 57 (L) >60 ml/min/1.73m2    Calcium 8.2 (L) 8.5 - 10.1 MG/DL    Bilirubin, total 1.5 (H) 0.2 - 1.0 MG/DL    ALT (SGPT) 131 (H) 16 - 61 U/L    AST (SGOT) 90 (H) 10 - 38 U/L    Alk.  phosphatase 104 45 - 117 U/L    Protein, total 5.9 (L) 6.4 - 8.2 g/dL    Albumin 2.5 (L) 3.4 - 5.0 g/dL    Globulin 3.4 2.0 - 4.0 g/dL    A-G Ratio 0.7 (L) 0.8 - 1.7     PHOSPHORUS    Collection Time: 12/26/20  1:30 AM   Result Value Ref Range    Phosphorus 3.4 2.5 - 4.9 MG/DL   HGB & HCT    Collection Time: 12/26/20  9:51 AM   Result Value Ref Range    HGB 8.7 (L) 13.0 - 16.0 g/dL    HCT 27.1 (L) 36.0 - 48.0 %       Procedures/imaging: see electronic medical records for all procedures/Xrays and details which were not copied into this note but were reviewed prior to creation of Betzy Solis MD   12/26/2020, 12:44 PM

## 2020-12-26 NOTE — PROGRESS NOTES
Problem: Pain  Goal: *Control of Pain  Outcome: Progressing Towards Goal  Goal: *PALLIATIVE CARE:  Alleviation of Pain  Outcome: Progressing Towards Goal     Problem: Breathing Pattern - Ineffective  Goal: *Absence of hypoxia  Outcome: Progressing Towards Goal  Goal: *Use of effective breathing techniques  Outcome: Progressing Towards Goal  Goal: *PALLIATIVE CARE:  Alleviation of Dyspnea  Outcome: Progressing Towards Goal

## 2020-12-26 NOTE — PROGRESS NOTES
20:00 Assessment completed. Lungs are clear bilat. Offers 0 c/o CP,pressure, or SOB. Resting quietly in bed with TV on. Voiding per urinal w/o difficulty. 22:35 Shift assessment completed. See nsg flow sheet for details. 03:00 Reassessed with 0 changes noted. Resting quietly in bed with eyes closed x for voiding per urinal w/o difficulty. 07:20 Bedside and Verbal shift change report given to Marline Zavala RN (oncoming nurse) by Aisha Liriano RN (offgoing nurse). Report included the following information SBAR.

## 2020-12-26 NOTE — PROGRESS NOTES
AFVSS  Abd - benign    Stable so far. No further recommendations  Recommend vascular surgery consult. Will sign off for now.

## 2020-12-27 ENCOUNTER — APPOINTMENT (OUTPATIENT)
Dept: GENERAL RADIOLOGY | Age: 60
DRG: 682 | End: 2020-12-27
Attending: INTERNAL MEDICINE

## 2020-12-27 LAB
ALBUMIN SERPL-MCNC: 2.4 G/DL (ref 3.4–5)
ALBUMIN/GLOB SERPL: 0.7 {RATIO} (ref 0.8–1.7)
ALP SERPL-CCNC: 102 U/L (ref 45–117)
ALT SERPL-CCNC: 134 U/L (ref 16–61)
ANION GAP SERPL CALC-SCNC: 4 MMOL/L (ref 3–18)
AST SERPL-CCNC: 88 U/L (ref 10–38)
BASOPHILS # BLD: 0 K/UL (ref 0–0.1)
BASOPHILS NFR BLD: 0 % (ref 0–3)
BILIRUB SERPL-MCNC: 1.3 MG/DL (ref 0.2–1)
BUN SERPL-MCNC: 23 MG/DL (ref 7–18)
BUN/CREAT SERPL: 19 (ref 12–20)
CALCIUM SERPL-MCNC: 8.2 MG/DL (ref 8.5–10.1)
CHLORIDE SERPL-SCNC: 111 MMOL/L (ref 100–111)
CO2 SERPL-SCNC: 27 MMOL/L (ref 21–32)
CREAT SERPL-MCNC: 1.18 MG/DL (ref 0.6–1.3)
DIFFERENTIAL METHOD BLD: ABNORMAL
EOSINOPHIL # BLD: 0.2 K/UL (ref 0–0.4)
EOSINOPHIL NFR BLD: 2 % (ref 0–5)
ERYTHROCYTE [DISTWIDTH] IN BLOOD BY AUTOMATED COUNT: 15.1 % (ref 11.6–14.5)
GLOBULIN SER CALC-MCNC: 3.4 G/DL (ref 2–4)
GLUCOSE BLD STRIP.AUTO-MCNC: 110 MG/DL (ref 70–110)
GLUCOSE SERPL-MCNC: 104 MG/DL (ref 74–99)
HCT VFR BLD AUTO: 25 % (ref 36–48)
HGB BLD-MCNC: 8.2 G/DL (ref 13–16)
LYMPHOCYTES # BLD: 2.4 K/UL (ref 0.8–3.5)
LYMPHOCYTES NFR BLD: 31 % (ref 20–51)
MCH RBC QN AUTO: 28.5 PG (ref 24–34)
MCHC RBC AUTO-ENTMCNC: 32.8 G/DL (ref 31–37)
MCV RBC AUTO: 86.8 FL (ref 74–97)
MONOCYTES # BLD: 0.3 K/UL (ref 0–1)
MONOCYTES NFR BLD: 4 % (ref 2–9)
NEUTS BAND NFR BLD MANUAL: 3 % (ref 0–5)
NEUTS SEG # BLD: 4.6 K/UL (ref 1.8–8)
NEUTS SEG NFR BLD: 60 % (ref 42–75)
NRBC BLD-RTO: 1 PER 100 WBC
PLATELET # BLD AUTO: 254 K/UL (ref 135–420)
PMV BLD AUTO: 9.2 FL (ref 9.2–11.8)
POTASSIUM SERPL-SCNC: 4.1 MMOL/L (ref 3.5–5.5)
PROT SERPL-MCNC: 5.8 G/DL (ref 6.4–8.2)
RBC # BLD AUTO: 2.88 M/UL (ref 4.7–5.5)
RBC MORPH BLD: ABNORMAL
RBC MORPH BLD: ABNORMAL
SODIUM SERPL-SCNC: 142 MMOL/L (ref 136–145)
URATE SERPL-MCNC: 4.4 MG/DL (ref 2.6–7.2)
WBC # BLD AUTO: 7.7 K/UL (ref 4.6–13.2)
WBC MORPH BLD: ABNORMAL

## 2020-12-27 PROCEDURE — 74011250637 HC RX REV CODE- 250/637: Performed by: HOSPITALIST

## 2020-12-27 PROCEDURE — 80053 COMPREHEN METABOLIC PANEL: CPT

## 2020-12-27 PROCEDURE — 84550 ASSAY OF BLOOD/URIC ACID: CPT

## 2020-12-27 PROCEDURE — 74011250637 HC RX REV CODE- 250/637: Performed by: INTERNAL MEDICINE

## 2020-12-27 PROCEDURE — 74011250636 HC RX REV CODE- 250/636: Performed by: INTERNAL MEDICINE

## 2020-12-27 PROCEDURE — 73100 X-RAY EXAM OF WRIST: CPT

## 2020-12-27 PROCEDURE — 82962 GLUCOSE BLOOD TEST: CPT

## 2020-12-27 PROCEDURE — 65660000000 HC RM CCU STEPDOWN

## 2020-12-27 PROCEDURE — 85025 COMPLETE CBC W/AUTO DIFF WBC: CPT

## 2020-12-27 PROCEDURE — 36415 COLL VENOUS BLD VENIPUNCTURE: CPT

## 2020-12-27 RX ORDER — ACETAMINOPHEN 325 MG/1
650 TABLET ORAL
Status: DISCONTINUED | OUTPATIENT
Start: 2020-12-27 | End: 2020-12-30 | Stop reason: HOSPADM

## 2020-12-27 RX ADMIN — Medication 10 ML: at 08:00

## 2020-12-27 RX ADMIN — Medication 10 ML: at 16:00

## 2020-12-27 RX ADMIN — ACETAMINOPHEN 650 MG: 325 TABLET ORAL at 22:40

## 2020-12-27 RX ADMIN — Medication 10 ML: at 00:37

## 2020-12-27 RX ADMIN — ACETAMINOPHEN 650 MG: 325 TABLET ORAL at 11:18

## 2020-12-27 RX ADMIN — PANTOPRAZOLE SODIUM 40 MG: 40 TABLET, DELAYED RELEASE ORAL at 06:43

## 2020-12-27 RX ADMIN — ACETAMINOPHEN 650 MG: 325 TABLET ORAL at 17:53

## 2020-12-27 RX ADMIN — SODIUM CHLORIDE 25 ML/HR: 900 INJECTION, SOLUTION INTRAVENOUS at 00:38

## 2020-12-27 NOTE — PROGRESS NOTES
Problem: Falls - Risk of  Goal: *Absence of Falls  Description: Document Ilene Spangler Fall Risk and appropriate interventions in the flowsheet.   Outcome: Progressing Towards Goal  Note: Fall Risk Interventions:  Mobility Interventions: Assess mobility with egress test, Patient to call before getting OOB         Medication Interventions: Teach patient to arise slowly, Patient to call before getting OOB    Elimination Interventions: Call light in reach, Patient to call for help with toileting needs, Urinal in reach              Problem: Patient Education: Go to Patient Education Activity  Goal: Patient/Family Education  Outcome: Progressing Towards Goal     Problem: Pain  Goal: *Control of Pain  Outcome: Progressing Towards Goal  Goal: *PALLIATIVE CARE:  Alleviation of Pain  Outcome: Progressing Towards Goal     Problem: Breathing Pattern - Ineffective  Goal: *Absence of hypoxia  Outcome: Progressing Towards Goal  Goal: *Use of effective breathing techniques  Outcome: Progressing Towards Goal  Goal: *PALLIATIVE CARE:  Alleviation of Dyspnea  Outcome: Progressing Towards Goal     Problem: Patient Education: Go to Patient Education Activity  Goal: Patient/Family Education  Outcome: Progressing Towards Goal

## 2020-12-27 NOTE — PROGRESS NOTES
1900 Received report from EZE Montgomery RN      0600 Pt c/o R thumb/hand pain. He refused pain medication but just wanted to report it. Nurse suggested an ice pack. 0720 Bedside and Verbal shift change report given to ZEE Montgomery RN (oncoming nurse) by Nils Frederick RN (offgoing nurse).  Report included the following information SBAR, Kardex, Accordion and Cardiac Rhythm SR.

## 2020-12-27 NOTE — PROGRESS NOTES
Comprehensive Nutrition Assessment    Type and Reason for Visit: (P) Initial, RD nutrition re-screen/LOS    Nutrition Recommendations/Plan: continue current POC    Nutrition Assessment:  (P) admit with hypoxia and ARF. Noted abdominal distention per MD note pt with hemoperitoneum that is stable and improving per MD note. Estimated Daily Nutrient Needs:  Energy (kcal): (P) 2644; Weight Used for Energy Requirements: (P) Current  Protein (g): (P) 104; Weight Used for Protein Requirements: (P) (1.2g/kg adj bw)  Fluid (ml/day): (P) 2644; Method Used for Fluid Requirements: (P) 1 ml/kcal      Nutrition Related Findings:  (P) labs reviewed, meds reviewed: Sandy@[employer], protonix. PO intakes % since admission. BM 12/24. Minimal wt hx available per chart review however no evidence of wt loss PTA. wt down from admit wt, likely fluid related with I/O -6L net      Wounds:    (P) None       Current Nutrition Therapies:  DIET RENAL Regular    Anthropometric Measures:  Height:  (P) 6' 0.01\" (182.9 cm)  Current Body Wt:  (P) 101.1 kg (222 lb 14.2 oz)   Admission Body Wt:  (P) 238 lb 1.6 oz    Usual Body Wt:  (P) 100.7 kg (222 lb)(minimal wt hx available, wt 2016 222lbs)     Ideal Body Wt:  (P) 178 lbs:  (P) 125.2 %   Adjusted Body Weight:   ; Weight Adjustment for: (P) No adjustment   Adjusted BMI:       BMI Category:  (P) Obese class 1 (BMI 30.0-34. 9)       Nutrition Diagnosis:   (P) No nutrition diagnosis at this time related to   as evidenced by      Nutrition Interventions:   Food and/or Nutrient Delivery: (P) Continue current diet  Nutrition Education and Counseling: (P) No recommendations at this time  Coordination of Nutrition Care: (P) Continue to monitor while inpatient, Interdisciplinary rounds    Goals:  (P) continue to meet estimated needs via PO intakes >75% throughout the next 5-7 days       Nutrition Monitoring and Evaluation:   Behavioral-Environmental Outcomes: (P) None identified  Food/Nutrient Intake Outcomes: (P) Food and nutrient intake  Physical Signs/Symptoms Outcomes:      Discharge Planning:    (P) Continue current diet     Electronically signed by Zari Hoffman on 12/27/2020 at 12:02 PM

## 2020-12-27 NOTE — PROGRESS NOTES
0700 : assumed care of patient from [address] : morning shift assessment completed and IV flushed. 1045 : spoke with Dr. Yumiko Healy regarding pain level in pt wrist. MD ordered tylenol. 1120 : patient pain level 6/10 in R wrist/thumb, given tylenol. 1330 : patient stated pain has gotten a little better. 1753 : patient requested pain medication, given Tylenol, See MAR   1900 : Bedside shift change report given to Vanessa Ariza (oncoming nurse) by Ayl Olea RN (offgoing nurse). Report included the following information SBAR, Kardex, Intake/Output and MAR.

## 2020-12-27 NOTE — PROGRESS NOTES
Hospitalist Progress Note    Patient: Rosa Morales MRN: [id number]  CSN: [id number]    YOB: 1960  Age: 56 y.o. Sex: male    DOA: 12/19/2020 LOS:  LOS: 8 days            Patient Active Problem List   Diagnosis Code    [facility] N17.9    Hypotension I95.9    Hypoxia R09.02    Elevated troponin R77.8    Distended abdomen R14.0    Anemia D64.9    Hematoma T14. 8XXA    Intra abdominal hemorrhage R58        IMPRESSION and Plan:    Rosa Morales is a 56 y.o. male with   Patient Active Problem List    Diagnosis Date Noted    Intra abdominal hemorrhage 12/24/2020    Hematoma 12/22/2020    Distended abdomen 12/20/2020    Anemia 12/20/2020    [facility]F [facility]acute renal failure) ([facility] 12/19/2020    Hypotension 12/19/2020    Hypoxia 12/19/2020    Elevated troponin 12/19/2020     Principal Problem:    Intra abdominal hemorrhage (12/24/2020)    Active Problems:    ARF (acute renal failure) ([facility] (12/19/2020)      Hypotension (12/19/2020)      Elevated troponin (12/19/2020)      Distended abdomen (12/20/2020)      Anemia (12/20/2020)      Hematoma (12/22/2020)        Pt presented fatigue on admission, he was found Olivia -he did take nsaids for 2 dose last week for wrist pain given to him by urgent care, renal on board. He reported his abdomen had been distended and no abdomen pain. Ct scan hemoperitoneum-repeated ct stable. cta hold due to olivia, surgeon on board who recomends  CTA and possible embolization through IR if hemoglobin drops         Acute renal failure - cr I continues to improve    Hypotension  -- bp is stable     Hypoxia - Resolved        Distended abdomen /hematoma ?  hemoperitoneum -  Repeat CT reviewed. Appears to be stable . H/h stable.   Consider obtaining vasular consult tomorrow.      Elevated troponin -       Anemia   Respond to blood transfusion    hemoperitoneum : so far stable from ct scan -Dr. Christopher Keenan discussed with IR Anastacio romeo        Continue to monitor and obtain stat CTA if hypotensive    Bell d/hannah    Ambulate     r wrist pain --? Gout. Check uric acid. Obtain xray. Fu labs as ordered    Consider obtaining CTA priro to dc per surg and consider vascular input  Patient's condition is fair        Recommend to continue hospitalization. Discussed with patient. Chief Complaints:   Chief Complaint   Patient presents with    Fatigue     SUBJECTIVE:  Pt is seen and examined. resting comfortably. C/o r wist pain -- stated this am.     No cp/sob/f/cn/v    Ambulating some        Review of systems:    Review of Systems   Constitutional: Positive for malaise/fatigue. HENT: Negative. Eyes: Negative. Respiratory: Negative for shortness of breath. Cardiovascular: Negative for chest pain, palpitations, orthopnea and leg swelling. Gastrointestinal: Negative. Negative for abdominal pain, diarrhea and heartburn. Genitourinary: Negative for dysuria and hematuria. Musculoskeletal: Positive for joint pain. Skin: Negative. Neurological: Positive for weakness. Psychiatric/Behavioral: Negative for depression, substance abuse and suicidal ideas. The patient is not nervous/anxious.         PE:  Patient Vitals for the past 24 hrs:   BP Temp Pulse Resp SpO2 Height Weight   12/27/20 1157 -- -- -- -- -- 6' 0.01\" (1.829 m) --   12/27/20 1115 (!) 147/86 97.2 °F (36.2 °C) 100 16 100 % -- --   12/27/20 0712 131/74 97.6 °F (36.4 °C) 100 18 98 % -- --   12/27/20 0502 (!) 146/78 98.3 °F (36.8 °C) 99 18 99 % -- 101.1 kg (222 lb 12.8 oz)   12/27/20 0003 130/66 98.8 °F (37.1 °C) 96 16 96 % -- --   12/26/20 2046 131/66 -- -- -- -- -- --   12/26/20 1923 (!) 162/83 98 °F (36.7 °C) 98 16 99 % -- --   12/26/20 1500 115/71 97.8 °F (36.6 °C) 93 14 100 % -- --       Intake/Output Summary (Last 24 hours) at 12/27/2020 1227  Last data filed at 12/27/2020 0503  Gross per 24 hour   Intake --   Output 600 ml   Net -600 ml     Patient Vitals for the past 120 hrs:   Weight   12/25/20 0426 108.9 kg (240 lb)   12/27/20 0502 101.1 kg (222 lb 12.8 oz)         Physical Exam  Vitals signs and nursing note reviewed. Constitutional:       General: He is in acute distress. Neck:      Musculoskeletal: Normal range of motion and neck supple. Vascular: No JVD. Cardiovascular:      Rate and Rhythm: Normal rate and regular rhythm. Heart sounds: Normal heart sounds. Pulmonary:      Effort: Respiratory distress present. Breath sounds: Normal breath sounds. Abdominal:      General: Bowel sounds are normal. There is no distension. Palpations: Abdomen is soft. Tenderness: There is no abdominal tenderness. There is no rebound. Musculoskeletal:      Right wrist: He exhibits decreased range of motion, tenderness and bony tenderness. He exhibits no swelling, no effusion and no deformity. Left wrist: Normal.   Skin:     General: Skin is warm and dry. Neurological:      Mental Status: He is alert and oriented to person, place, and time. Psychiatric:         Mood and Affect: Affect normal.             Intake and Output:  Current Shift:  No intake/output data recorded. Last three shifts:  12/25 1901 - 12/27 0700  In: 1140 [P.O.:240;  I.V.:900]  Out: 700 [Urine:700]    Lab/Data Reviewed:  Recent Results (from the past 8 hour(s))   GLUCOSE, POC    Collection Time: 12/27/20  6:23 AM   Result Value Ref Range    Glucose (POC) 110 70 - 110 mg/dL     Medications:  Current Facility-Administered Medications   Medication Dose Route Frequency    acetaminophen (TYLENOL) tablet 650 mg  650 mg Oral Q4H PRN    0.9% sodium chloride infusion 250 mL  250 mL IntraVENous PRN    0.9% sodium chloride infusion  25 mL/hr IntraVENous CONTINUOUS    pantoprazole (PROTONIX) tablet 40 mg  40 mg Oral ACB    sodium chloride (NS) flush 5-40 mL  5-40 mL IntraVENous Q8H    sodium chloride (NS) flush 5-40 mL  5-40 mL IntraVENous PRN    [Held by provider] heparin (porcine) injection 5,000 Units  5,000 Units SubCUTAneous Q8H       Recent Results (from the past 24 hour(s))   GLUCOSE, POC    Collection Time: 12/26/20  9:06 PM   Result Value Ref Range    Glucose (POC) 105 70 - 110 mg/dL   CBC WITH AUTOMATED DIFF    Collection Time: 12/27/20 12:26 AM   Result Value Ref Range    WBC 7.7 4.6 - 13.2 K/uL    RBC 2.88 (L) 4.70 - 5.50 M/uL    HGB 8.2 (L) 13.0 - 16.0 g/dL    HCT 25.0 (L) 36.0 - 48.0 %    MCV 86.8 74.0 - 97.0 FL    MCH 28.5 24.0 - 34.0 PG    MCHC 32.8 31.0 - 37.0 g/dL    RDW 15.1 (H) 11.6 - 14.5 %    PLATELET 343 675 - 008 K/uL    MPV 9.2 9.2 - 11.8 FL    NEUTROPHILS 60 42 - 75 %    BAND NEUTROPHILS 3 0 - 5 %    LYMPHOCYTES 31 20 - 51 %    MONOCYTES 4 2 - 9 %    EOSINOPHILS 2 0 - 5 %    BASOPHILS 0 0 - 3 %    NRBC 1.0 (H) 0  WBC    ABS. NEUTROPHILS 4.6 1.8 - 8.0 K/UL    ABS. LYMPHOCYTES 2.4 0.8 - 3.5 K/UL    ABS. MONOCYTES 0.3 0 - 1.0 K/UL    ABS. EOSINOPHILS 0.2 0.0 - 0.4 K/UL    ABS. BASOPHILS 0.0 0.0 - 0.1 K/UL    RBC COMMENTS ANISOCYTOSIS  1+        RBC COMMENTS POLYCHROMASIA  SLIGHT  OVALOCYTES  FEW        WBC COMMENTS TOXIC GRANULATION      DF MANUAL     METABOLIC PANEL, COMPREHENSIVE    Collection Time: 12/27/20 12:26 AM   Result Value Ref Range    Sodium 142 136 - 145 mmol/L    Potassium 4.1 3.5 - 5.5 mmol/L    Chloride 111 100 - 111 mmol/L    CO2 27 21 - 32 mmol/L    Anion gap 4 3.0 - 18 mmol/L    Glucose 104 (H) 74 - 99 mg/dL    BUN 23 (H) 7.0 - 18 MG/DL    Creatinine 1.18 0.6 - 1.3 MG/DL    BUN/Creatinine ratio 19 12 - 20      GFR est AA >60 >60 ml/min/1.73m2    GFR est non-AA >60 >60 ml/min/1.73m2    Calcium 8.2 (L) 8.5 - 10.1 MG/DL    Bilirubin, total 1.3 (H) 0.2 - 1.0 MG/DL    ALT (SGPT) 134 (H) 16 - 61 U/L    AST (SGOT) 88 (H) 10 - 38 U/L    Alk.  phosphatase 102 45 - 117 U/L    Protein, total 5.8 (L) 6.4 - 8.2 g/dL    Albumin 2.4 (L) 3.4 - 5.0 g/dL    Globulin 3.4 2.0 - 4.0 g/dL    A-G Ratio 0.7 (L) 0.8 - 1.7     GLUCOSE, POC    Collection Time: 12/27/20  6:23 AM   Result Value Ref Range Glucose (POC) 110 70 - 110 mg/dL     _______________     IMPRESSION  IMPRESSION:     1. Stable moderate to large size right upper abdominal mesenteric hematoma,  with mild extension to the right of midline upper pelvis. No significant  interval change given interval differences in positioning between the prior  studies from 12/20/2020.        2. Mild persistent edematous gastroduodenal and transverse colonic mural edema,  considerations would include reactive etiology relating to the adjacent  hematoma. No findings of large or small bowel obstruction.     3.  Minimal right posterior basilar atelectasis with trace residual pleural  effusion, significantly improved prior exam.         Imaging    CT ABD PELV WO CONT (Order: [id number]) - 12/26/2020      Procedures/imaging: see electronic medical records for all procedures/Xrays and details which were not copied into this note but were reviewed prior to creation of Mary Harrison MD   12/27/2020, 12:44 PM

## 2020-12-28 LAB
ALBUMIN SERPL-MCNC: 2.5 G/DL (ref 3.4–5)
ALBUMIN/GLOB SERPL: 0.7 {RATIO} (ref 0.8–1.7)
ALP SERPL-CCNC: 113 U/L (ref 45–117)
ALT SERPL-CCNC: 134 U/L (ref 16–61)
ANION GAP SERPL CALC-SCNC: 8 MMOL/L (ref 3–18)
AST SERPL-CCNC: 93 U/L (ref 10–38)
BASOPHILS # BLD: 0 K/UL (ref 0–0.1)
BASOPHILS NFR BLD: 0 % (ref 0–2)
BILIRUB SERPL-MCNC: 1.2 MG/DL (ref 0.2–1)
BUN SERPL-MCNC: 21 MG/DL (ref 7–18)
BUN/CREAT SERPL: 18 (ref 12–20)
CALCIUM SERPL-MCNC: 8.6 MG/DL (ref 8.5–10.1)
CHLORIDE SERPL-SCNC: 109 MMOL/L (ref 100–111)
CO2 SERPL-SCNC: 27 MMOL/L (ref 21–32)
CREAT SERPL-MCNC: 1.2 MG/DL (ref 0.6–1.3)
DIFFERENTIAL METHOD BLD: ABNORMAL
EOSINOPHIL # BLD: 0.2 K/UL (ref 0–0.4)
EOSINOPHIL NFR BLD: 2 % (ref 0–5)
ERYTHROCYTE [DISTWIDTH] IN BLOOD BY AUTOMATED COUNT: 15 % (ref 11.6–14.5)
GLOBULIN SER CALC-MCNC: 3.6 G/DL (ref 2–4)
GLUCOSE SERPL-MCNC: 94 MG/DL (ref 74–99)
HCT VFR BLD AUTO: 24.6 % (ref 36–48)
HGB BLD-MCNC: 7.9 G/DL (ref 13–16)
LYMPHOCYTES # BLD: 2 K/UL (ref 0.9–3.6)
LYMPHOCYTES NFR BLD: 24 % (ref 21–52)
MAGNESIUM SERPL-MCNC: 2 MG/DL (ref 1.6–2.6)
MCH RBC QN AUTO: 27.9 PG (ref 24–34)
MCHC RBC AUTO-ENTMCNC: 32.1 G/DL (ref 31–37)
MCV RBC AUTO: 86.9 FL (ref 74–97)
MONOCYTES # BLD: 0.6 K/UL (ref 0.05–1.2)
MONOCYTES NFR BLD: 7 % (ref 3–10)
NEUTS SEG # BLD: 5.7 K/UL (ref 1.8–8)
NEUTS SEG NFR BLD: 67 % (ref 40–73)
PHOSPHATE SERPL-MCNC: 3.5 MG/DL (ref 2.5–4.9)
PLATELET # BLD AUTO: 261 K/UL (ref 135–420)
PMV BLD AUTO: 9.7 FL (ref 9.2–11.8)
POTASSIUM SERPL-SCNC: 4.2 MMOL/L (ref 3.5–5.5)
PROT SERPL-MCNC: 6.1 G/DL (ref 6.4–8.2)
RBC # BLD AUTO: 2.83 M/UL (ref 4.7–5.5)
SODIUM SERPL-SCNC: 144 MMOL/L (ref 136–145)
WBC # BLD AUTO: 8.5 K/UL (ref 4.6–13.2)

## 2020-12-28 PROCEDURE — 85025 COMPLETE CBC W/AUTO DIFF WBC: CPT

## 2020-12-28 PROCEDURE — 80053 COMPREHEN METABOLIC PANEL: CPT

## 2020-12-28 PROCEDURE — 84100 ASSAY OF PHOSPHORUS: CPT

## 2020-12-28 PROCEDURE — 83735 ASSAY OF MAGNESIUM: CPT

## 2020-12-28 PROCEDURE — 74011250637 HC RX REV CODE- 250/637: Performed by: INTERNAL MEDICINE

## 2020-12-28 PROCEDURE — 74011250637 HC RX REV CODE- 250/637: Performed by: HOSPITALIST

## 2020-12-28 PROCEDURE — 97535 SELF CARE MNGMENT TRAINING: CPT

## 2020-12-28 PROCEDURE — 36415 COLL VENOUS BLD VENIPUNCTURE: CPT

## 2020-12-28 PROCEDURE — 97165 OT EVAL LOW COMPLEX 30 MIN: CPT

## 2020-12-28 PROCEDURE — 65660000000 HC RM CCU STEPDOWN

## 2020-12-28 RX ADMIN — PANTOPRAZOLE SODIUM 40 MG: 40 TABLET, DELAYED RELEASE ORAL at 06:18

## 2020-12-28 RX ADMIN — ACETAMINOPHEN 650 MG: 325 TABLET ORAL at 03:30

## 2020-12-28 RX ADMIN — ACETAMINOPHEN 650 MG: 325 TABLET ORAL at 18:19

## 2020-12-28 RX ADMIN — ACETAMINOPHEN 650 MG: 325 TABLET ORAL at 10:54

## 2020-12-28 NOTE — PROGRESS NOTES
Care manager rounded on patient; resting in bed, states he is feeling better and is pending and evaluation today which he is unclear about - noted vascular surgeon's assessment of mesenteric hematoma of unknown etiology and recommendations for continued conservative management. Noted OT recommendation for [facility]; please consider PT eval when appropriate.

## 2020-12-28 NOTE — PROGRESS NOTES
Problem: Falls - Risk of  Goal: *Absence of Falls  Description: Document Arya Boswell Fall Risk and appropriate interventions in the flowsheet.   Outcome: Progressing Towards Goal  Note: Fall Risk Interventions:  Mobility Interventions: Assess mobility with egress test         Medication Interventions: Teach patient to arise slowly    Elimination Interventions: Call light in reach              Problem: Pain  Goal: *Control of Pain  Outcome: Progressing Towards Goal     Problem: Breathing Pattern - Ineffective  Goal: *Absence of hypoxia  Outcome: Progressing Towards Goal     Problem: Anemia Care Plan (Adult and Pediatric)  Goal: *Labs within defined limits  Outcome: Progressing Towards Goal

## 2020-12-28 NOTE — PROGRESS NOTES
Hospitalist Progress Note-critical care note     Patient: Atul West MRN: [id number]  CSN: [id number]    YOB: 1960  Age: 56 y.o. Sex: male    DOA: 12/19/2020 LOS:  LOS: 9 days            Chief complaint: anemia , distended abdomen, hypotension elevated trop     Assessment/Plan         Hospital Problems  Never Reviewed          Codes Class Noted POA    * (Principal) Intra abdominal hemorrhage ICD-10-CM: R58  ICD-9-CM: 459.0  12/24/2020 Unknown        Hematoma ICD-10-CM: T14. 8XXA  ICD-9-CM: 924.9  12/22/2020 Unknown        Distended abdomen ICD-10-CM: R14.0  ICD-9-CM: 787.3  12/20/2020 Unknown        Anemia ICD-10-CM: D64.9  ICD-9-CM: 285.9  12/20/2020 Unknown        ARF (acute renal failure) ([facility] ICD-10-CM: N17.9  ICD-9-CM: 584.9  12/19/2020 Unknown        Hypotension ICD-10-CM: I95.9  ICD-9-CM: 458.9  12/19/2020 Unknown        Elevated troponin ICD-10-CM: R77.8  ICD-9-CM: 790.6  12/19/2020 Unknown              Pt presented fatigue on admission, he was found Olivia -he did took nsaids for 2 dose last Wednesday, renal on board. He reported his abdomen had been distended and no abdomen pain. Ct scan hemoperitoneum-repeated ct stable. cta hold due to olivia, surgeon on board        Acute renal failure -  Resolved per iv hydration      Hypotension  Resolved     Hypoxia -  Resolved  CXR wnl   Cannot get CTA chest because of creatinine   V/Q scan:  WNL     Distended abdomen Lb Wagner    Last ct abdomen done on 12/26-mesenteric hematoma,case discussed with vascular surgeon-Dr. Diana Arechiga        Elevated troponin -  Presenting troponin 0.19-trending down to 0.08, no chest pain, no acs -due to olivia   Echo: ef wnl and and The left ventricular wall motion is normal.      Anemia   Respond to blood transfusion   H/h monitoring     Subjective:  no abdomen pain , ok to have contrast     Discussed with pt  again about the benefit of possible finding the bleeding site per cta and the risk of olivia  from iv contrast. He is ok to have iv contrast. Will have vascular on board. Continue iv hydration to protect renal function     Disposition :tbd,   Review of systems:    General: No fevers or chills. Cardiovascular: No chest pain or pressure. No palpitations. Pulmonary: No shortness of breath. Gastrointestinal: No nausea, vomiting. Vital signs/Intake and Output:  Visit Vitals  /61   Pulse 100   Temp 97.8 °F (36.6 °C)   Resp 16   Ht 6' 0.01\" (1.829 m)   Wt 101.1 kg (222 lb 12.8 oz)   SpO2 98%   BMI 30.21 kg/m²     Current Shift:  No intake/output data recorded. Last three shifts:  12/26 1901 - 12/28 0700  In: 725 [P.O.:450; I.V.:275]  Out: 1400 [Urine:1400]    Physical Exam:  General: WD, WN. Alert, cooperative, no acute distress    HEENT: NC, Atraumatic. PERRLA, anicteric sclerae. Lungs: CTA Bilaterally. No Wheezing/Rhonchi/Rales. Heart:  Regular  rhythm,  No murmur, No Rubs, No Gallops  Abdomen: Soft, +distended, Non tender. +Bowel sounds,   Extremities: No c/c/e  Psych:   Not anxious or agitated. Neurologic:  No acute neurological deficit. Labs: Results:       Chemistry Recent Labs     12/28/20 0115 12/27/20 0026 12/26/20  0130   GLU 94 104* 108*    142 141   K 4.2 4.1 4.3    111 110   CO2 27 27 26   BUN 21* 23* 29*   CREA 1.20 1.18 1.28   CA 8.6 8.2* 8.2*   AGAP 8 4 5   BUCR 18 19 23*    102 104   TP 6.1* 5.8* 5.9*   ALB 2.5* 2.4* 2.5*   GLOB 3.6 3.4 3.4   AGRAT 0.7* 0.7* 0.7*      CBC w/Diff Recent Labs     12/28/20 0115 12/27/20 0026 12/26/20  0951 12/26/20  0130   WBC 8.5 7.7  --  8.7   RBC 2.83* 2.88*  --  2.84*   HGB 7.9* 8.2* 8.7* 8.1*   HCT 24.6* 25.0* 27.1* 24.7*    254  --  235   GRANS 67 60  --  64   LYMPH 24 31  --  22   EOS 2 2  --  2      Cardiac Enzymes No results for input(s): CPK, CKND1, NISSA in the last 72 hours. No lab exists for component: CKRMB, TROIP   Coagulation No results for input(s): PTP, INR, APTT, INREXT, INREXT in the last 72 hours. Lipid Panel No results found for: CHOL, CHOLPOCT, CHOLX, CHLST, CHOLV, 575886, HDL, HDLP, LDL, LDLC, DLDLP, 478511, VLDLC, VLDL, TGLX, TRIGL, TRIGP, TGLPOCT, CHHD, CHHDX   BNP No results for input(s): BNPP in the last 72 hours. Liver Enzymes Recent Labs     12/28/20  0115   TP 6.1*   ALB 2.5*         Thyroid Studies No results found for: T4, T3U, TSH, TSHEXT, TSHEXT     Procedures/imaging: see electronic medical records for all procedures/Xrays and details which were not copied into this note but were reviewed prior to creation of Plan    Nm Lung Vent/perf    Result Date: 12/19/2020  History: Pulmonary embolus. Dose: 6.6 mCi 99 technetium MAA with left antecubital fossa injection site. This is a perfusion only study. There is no chest x-ray for comparison. The distribution of the radiotracer with the 8 different views post injection of the perfusion imaging material demonstrates physiologic distribution. .     IMPRESSION: Normal ventilation only lung scan    Xr Wrist Rt Ap/lat/obl Min 3v    Result Date: 12/18/2020  History: Swelling. No trauma. COMPARISON: None. 3 views of the right wrist demonstrate degenerative joint space narrowing with osteophyte formation at the first metacarpal/carpal joint. This appears moderately severe. No acute fracture. No dislocation. Bone density normal. There may be some soft tissue swelling dorsum of the hand and wrist. This is nonspecific. IMPRESSION: 1. 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 No fracture. Ct Head Wo Cont    Result Date: 12/18/2020  EXAM: CT HEAD, WITHOUT IV CONTRAST COMPARISON: None INDICATION: Possible seizure TECHNIQUE: Multiple axial CT images of the head were obtained extending from the skull base through the vertex. Additional coronal and sagittal reformations were also performed.  One or more dose reduction techniques were used on this CT: automated exposure control, adjustment of the mAs and/or kVp according to patient size, and iterative reconstruction techniques. The specific techniques used on this CT exam have been documented in the patient's electronic medical record. Digital Imaging and Communications in Medicine (DICOM) format image data are available to nonaffiliated external healthcare facilities or entities on a secure, media free, reciprocally searchable basis with patient authorization for at least a 12-month period after this study. _______________ FINDINGS: BRAIN AND POSTERIOR FOSSA: The sulci, folia, ventricles and basal cisterns are within normal limits for the patient's age. There is no intracranial hemorrhage, mass effect, or midline shift. There are no areas of abnormal parenchymal attenuation. EXTRA-AXIAL SPACES AND MENINGES: There are no abnormal extra-axial fluid collections. CALVARIUM: No acute osseous abnormality. OTHER: The visualized portions of the paranasal sinuses and mastoid air cells are clear. _______________     IMPRESSION: No CT evidence of an acute intracranial abnormality - in particular, no acute cortical infarct, hemorrhage, or mass effect. Ct Abd Pelv Wo Cont    Result Date: 12/20/2020  EXAM: CT of the abdomen and pelvis CLINICAL INDICATION/HISTORY: abdomen distended, dilcia r/o obstruction   > Additional: None. COMPARISON: None. > Reference Exam: None. TECHNIQUE: Axial CT imaging of the abdomen and pelvis was performed without intravenous contrast. Multiplanar reformats were generated. One or more dose reduction techniques were used on this CT: automated exposure control, adjustment of the mAs and/or kVp according to patient size, and iterative reconstruction techniques. The specific techniques used on this CT exam have been documented in the patient's electronic medical record.   Digital Imaging and Communications in Medicine (DICOM) format image data are available to nonaffiliated external healthcare facilities or entities on a secure, media free, reciprocally searchable basis with patient authorization for at least a 12-month period after this study. _______________ FINDINGS: LOWER CHEST: Small right pleural effusion and atelectasis with respiratory motion in the lung bases. Partially visualized bilateral gynecomastia. Low-attenuation of the blood pool suggesting anemia. LIVER, BILIARY: Diffuse low-attenuation of the liver compatible with steatosis. No biliary dilation. Gallbladder is unremarkable. PANCREAS: Normal. SPLEEN: Normal. ADRENALS: Normal. KIDNEYS/URETERS/BLADDER: Bell catheter in place within the urinary bladder. Several locules of gas in keeping with recent image mentation. Unenhanced kidneys are unremarkable. LYMPH NODES: No enlarged lymph nodes. GASTROINTESTINAL TRACT: Assessment is limited given lack of contrast. There is suggestion of abnormal wall thickening involving the duodenum. The stomach is partially distended and fluid-filled with an air-fluid level. In addition, there is liquid stool throughout the colon to the rectum, compatible with nonspecific diarrheal illness. PELVIC ORGANS: Small amount of hemoperitoneum extends into the pelvis. VASCULATURE: Unremarkable. BONES: No acute or aggressive osseous abnormalities identified. Degenerative changes are noted including bridging osteophyte formation about both SI joints. OTHER: High attenuating fluid along the inferior right hepatic lobe extending into the central mesentery is noted which measures 50-60 Hounsfield units compatible with hemoperitoneum. _______________     IMPRESSION: 1. Mild to moderate volume of hemoperitoneum of uncertain etiology tracking along the inferior right hepatic lobe and into the central mesentery with a small amount of pelvic free fluid as well. 2. Inflammatory changes in the duodenum as well as distended and partially fluid-filled stomach and liquid stool throughout the colon may represent gastroenteritis and nonspecific diarrheal illness and may be related to the above-described pneumoperitoneum. Superimposed peptic ulcer disease not excluded. No evidence of pneumatosis or free air, however. Us Retroperitoneum Comp    Result Date: 12/20/2020  EXAM: Complete retroperitoneal ultrasound INDICATION: Pain. COMPARISON: None. _______________ FINDINGS: RIGHT KIDNEY: 9.9 cm in length. No hydronephrosis. No solid renal mass. No visible calculi. Normal renal echotexture and cortical thickness. LEFT KIDNEY: 11.4 cm in length. No hydronephrosis. No solid renal mass. No visible calculi. Normal renal echotexture and cortical thickness. BLADDER: Bladder is decompressed with a Bell catheter in place. OTHER: Prostate was not adequately visualized. _______________     IMPRESSION:  Unremarkable ultrasound of the kidneys. Bell catheter in place within the decompressed urinary bladder. Xr Chest Port    Result Date: 12/19/2020  History: Short of breath. COMPARISON: None. Portable chest x-ray performed. There is elevation of the right hemidiaphragm which is nonspecific. No focal infiltrate, pneumothorax or effusion. Heart size appears normal. Mediastinum appears normal. No skeletal abnormalities. IMPRESSION: 1. No acute pulmonary disease.       Varghese Flowers MD

## 2020-12-28 NOTE — PROGRESS NOTES
1905 assumed care of patient from M. Lindaann Lundborg, RN. Patient shows no signs of discomfort. Bed is at the lowest position,personal items,phone and call light are within reach. 2105. Patient is alert and orient X 4 . Denies of SOB, chest pain. no signs of discomfort noted. symmetrical chest movement. lungs are clear , dyspnea on exertion. cap refill less than 3 sec's. 20 G IV dressing  at the right [facility] is clean dry and intact. Patient complains of right wrist pain 8/10 . Bed is at the lowest position,personal items,phone and call light are within reach. 0150reassessment completed, no changes noted . See nursing flow sheet. Bed is at the lowest position,personal items,phone and call light are within reach. 0330 patient in bed rated wrist pain 8/10. See MAR. Bed is at the lowest position,personal items,phone and call light are within reach. 0715 Bedside, Verbal and Written shift change report given to MARILEE Agudelo (oncoming nurse) by STUART Pat RN (offgoing nurse). Report included the following information Kardex, ED Summary, OR Summary, Intake/Output, Recent Results and Med Rec Status.

## 2020-12-28 NOTE — PROGRESS NOTES
Patient seen and examined. I am asked to see this gentleman for a mesenteric hematoma mostly on the right and some extending into the pelvis. History is very difficult to follow but apparently he had some seizures and mental status changes with acute kidney injury secondary to end-stage intake for wrist injury. He was found to have abdominal distention a CT scan was obtained. Mesenteric hematoma was identified. Because of his acute kidney injury no contrast was utilized. The hematoma appears to be stable to slightly decreased in size. He received 1 transfusion on 21 December and none since then and his hemoglobin has been relatively stable between 7.8-8.5. He says his bowel movements were initially loose but now more normal.  He is making urine. He is not complaining of abdominal or flank pain. In my opinion I would hydrate him up and monitor his hemoglobin and hematocrit and maybe obtain another CT scan without contrast in 48 hours. If he is stable and the hematoma is not expanding then I do not see a need to do mesenteric arteriography with embolization. I would consult interventional radiology to be on standby for this. We will continue to follow with you. I am unsure about the etiology of this mesenteric hematoma. Based on his clinical history it does not make sense.       [address] [facility] [address]   [phone]

## 2020-12-28 NOTE — PROGRESS NOTES
Problem: Self Care Deficits Care Plan (Adult)  Goal: *Acute Goals and Plan of Care (Insert Text)  Description: Occupational Therapy Goals  Initiated 12/28/2020 within 7 day(s). 1.  Patient will perform grooming with independence   2. Patient will perform bathing with independence. 3.  Patient will perform lower body dressing with independence. 4.  Patient will perform toilet transfers with independence. 5.  Patient will perform all aspects of toileting with independence. 6.  Patient will participate in upper extremity therapeutic exercise/activities with independence for 5 minutes. 7.  Patient will utilize energy conservation techniques during functional activities with verbal cues. Prior Level of Function: Independent with ADL's and transfers      Outcome: Progressing Towards Goal   OCCUPATIONAL THERAPY EVALUATION    Patient: Elke Hinkle (37 y.o. male)  Date: 12/28/2020  Primary Diagnosis: ARF (acute renal failure) ([facility] [address] [N17.9]  Hypotension [I95.9]        Precautions:  Fall  PLOF: Independent with ADL's and transfers    ASSESSMENT :  Based on the objective data described below, the patient presents with pain and decreased RUE ROM and strength limiting independence with ADLs. Pt received sitting on EOB. Pt rated his pain level a 7/10 at rest. Pt did LB ADL's seated on EOB. Pt required SBA to doff bilateral gripper socks but needed Min A to don bilateral gripper socks due to pain in R thumb. Patient able to bend forward to don/doff socks. Pt did sit to stand from EOB with supervision and ambulated to BR without an AD. Pt did toilet transfer with supervision and then washed his hands standing at the sink with supervision. Pt requested to sit in chair at the end of the session with call light in reach and all needs met. Pt given ice pack to use on his R thumb to decrease pain. Pt encouraged to do finger/thumb AROM and opposition to maintain ROM and functional use of RUE during ADL's and transfers. Pt able to demonstrate and vocalize understanding. Education: role of OT, OT POC and goals, safety with OOB activity, hand placement with transfers, AROM with digits/thumb and benefits of doing AROM daily x 15 reps 3-5 times/day    Patient will benefit from skilled intervention to address the above impairments. Patient's rehabilitation potential is considered to be Good  Factors which may influence rehabilitation potential include:   []             None noted  []             Mental ability/status  []             Medical condition  []             Home/family situation and support systems  []             Safety awareness  [x]             Pain tolerance/management  []             Other:      PLAN :  Recommendations and Planned Interventions:   [x]               Self Care Training                  [x]      Therapeutic Activities  [x]               Functional Mobility Training   []      Cognitive Retraining  [x]               Therapeutic Exercises           [x]      Endurance Activities  [x]               Balance Training                    []      Neuromuscular Re-Education  []               Visual/Perceptual Training     [x]      Home Safety Training  [x]               Patient Education                   [x]      Family Training/Education  []               Other (comment):    Frequency/Duration: Patient will be followed by occupational therapy 1-2 times per day/4-7 days per week to address goals. Discharge Recommendations: Home Health  Further Equipment Recommendations for Discharge: N/A     SUBJECTIVE:   Patient stated my thumb has really been hurting. The tylenol has been helping.     OBJECTIVE DATA SUMMARY:   History reviewed. No pertinent past medical history. History reviewed. No pertinent surgical history.   Barriers to Learning/Limitations: yes;  physical  Compensate with: visual, verbal, tactile, kinesthetic cues/model    Home Situation:   Home Situation  Home Environment: Private residence  # Steps to Enter: 1  Rails to [facility]: No  Wheelchair Ramp: No  One/Two Story Residence: Two story  # of Interior Steps: 10  Interior Rails: Both  Living Alone: No  Support Systems: Family member(s)(lives with his sister)  Patient Expects to be Discharged to[de-identified] Private residence  Current DME Used/Available at Home: None  Tub or Shower Type: Tub/Shower combination  [x]  Right hand dominant   []  Left hand dominant    Cognitive/Behavioral Status:  Neurologic State: Alert  Orientation Level: Oriented X4  Cognition: Appropriate decision making; Follows commands  Safety/Judgement: Awareness of environment    Edema: present in R thumb    Vision/Perceptual:     Acuity: Within Defined Limits    Corrective Lenses: Reading glasses    Coordination: BUE  Coordination: Generally decreased, functional(decreased in R digits especially thumb)  Fine Motor Skills-Upper: Left Intact; Right Impaired    Gross Motor Skills-Upper: Left Intact; Right Intact    Balance:  Sitting: Intact  Standing: Intact; Without support    Strength: BUE  Strength: Generally decreased, functional(decreased in R digits especially thumb)     Tone & Sensation: BUE  Tone: Normal  Sensation: Intact     Range of Motion: BUE  AROM: Generally decreased, functional(decreased in R digits especially thumb)     Functional Mobility and Transfers for ADLs:  Bed Mobility:   Scooting: Supervision  Transfers:  Sit to Stand: Supervision   Toilet Transfer : Supervision   Bathroom Mobility: Supervision/set up    ADL Assessment:   Feeding: Independent  Oral Facial Hygiene/Grooming: Supervision  Bathing: Minimum assistance  Upper Body Dressing: Supervision  Lower Body Dressing: Minimum assistance  Toileting: Supervision     ADL Intervention:   Grooming  Grooming Assistance: Supervision  Position Performed: Standing  Washing Hands: Supervision    Lower Body Dressing Assistance  Dressing Assistance: Minimum assistance  Socks: Minimum assistance  Leg Crossed Method Used: No  Position Performed: Bending forward method;Seated edge of bed  Cues: Don;Physical assistance    Cognitive Retraining  Safety/Judgement: Awareness of environment    Therapeutic Exercise:  R digits/thumb AROM finger flexion/extension and opposition x 15 reps    Pain:  Pain level pre-treatment: 7/10   Pain level post-treatment: 7/10   Pain Intervention(s): Medication provided by Nursing (see MAR); Rest, Ice, Repositioning  Response to intervention: Nurse notified, See doc flow sheet    Activity Tolerance:   Fair. Patient able to stand 3 minute(s). Patient able to complete ADLs with minimal rest breaks. Patient limited by R thumb pain. Please refer to the flowsheet for vital signs taken during this treatment. After treatment:   [x] Patient left in no apparent distress sitting up in chair  [] Patient left in no apparent distress in bed  [x] Call bell left within reach  [x] Nursing notified  [] Caregiver present  [] Bed alarm activated    COMMUNICATION/EDUCATION:   [x] Role of Occupational Therapy in the acute care setting  [x] Home safety education was provided and the patient/caregiver indicated understanding. [x] Patient/family have participated as able in goal setting and plan of care. [x] Patient/family agree to work toward stated goals and plan of care. [] Patient understands intent and goals of therapy, but is neutral about his/her participation. [] Patient is unable to participate in goal setting and plan of care. Thank you for this referral.  Nikolas Burdick OTR/L MOT  Time Calculation: 29 mins    Eval Complexity: History: LOW Complexity : Brief history review ; Examination: LOW Complexity : 1-3 performance deficits relating to physical, cognitive , or psychosocial skils that result in activity limitations and / or participation restrictions ;    Decision Making:LOW Complexity : No comorbidities that affect functional and no verbal or physical assistance needed to complete eval tasks

## 2020-12-29 ENCOUNTER — APPOINTMENT (OUTPATIENT)
Dept: CT IMAGING | Age: 60
DRG: 682 | End: 2020-12-29
Attending: HOSPITALIST

## 2020-12-29 PROCEDURE — 74011000636 HC RX REV CODE- 636: Performed by: FAMILY MEDICINE

## 2020-12-29 PROCEDURE — 74011250637 HC RX REV CODE- 250/637: Performed by: INTERNAL MEDICINE

## 2020-12-29 PROCEDURE — 65660000000 HC RM CCU STEPDOWN

## 2020-12-29 PROCEDURE — 74011250637 HC RX REV CODE- 250/637: Performed by: HOSPITALIST

## 2020-12-29 PROCEDURE — 74174 CTA ABD&PLVS W/CONTRAST: CPT

## 2020-12-29 RX ADMIN — Medication 10 ML: at 00:00

## 2020-12-29 RX ADMIN — ACETAMINOPHEN 650 MG: 325 TABLET ORAL at 06:16

## 2020-12-29 RX ADMIN — ACETAMINOPHEN 650 MG: 325 TABLET ORAL at 10:55

## 2020-12-29 RX ADMIN — ACETAMINOPHEN 650 MG: 325 TABLET ORAL at 21:11

## 2020-12-29 RX ADMIN — PANTOPRAZOLE SODIUM 40 MG: 40 TABLET, DELAYED RELEASE ORAL at 06:47

## 2020-12-29 RX ADMIN — IOPAMIDOL 100 ML: 755 INJECTION, SOLUTION INTRAVENOUS at 15:41

## 2020-12-29 NOTE — PROGRESS NOTES
0011-Resting quietly in bed. Call light and personal items within reach. White board updated. No complaints or request at this time. Stable. 0334-Resting quietly in bed. Stable. Personal items within reach at bedside. 0616-Medicated for pain, complains of right hand pain. Shift Summary:  Stable at shift change.

## 2020-12-29 NOTE — PROGRESS NOTES
0800  Assumed care at this time. Patient alert and oriented x4. Denies SOB, chest pain. Shows no signs of distress. Patient lungs clear bilaterally. Cap refill < 3 sec to all extremities. Patient has 20 G IV to R [facility] CDI. Stated pain 7/10. Bowel sounds present. Skin intact. Patient call light and possessions within reach. Bed locked and in lowest position. Nurse will continue to monitor. 1930  Shift Summary  Patient alert and oriented x4. Patient had uneventful shift. Ambulating and voiding sufficient amounts. Pain controlled by prn tylenol. Continues to deny nausea/blood in stool. Mild abdominal distension not any greater than observed in morning assessment.

## 2020-12-29 NOTE — PROGRESS NOTES
1940 Bedside and Verbal shift change report given to Pascual Mendes RN (oncoming nurse) by Tameka Askew RN (offgoing nurse). Report included the following information SBAR, Kardex, MAR, Recent Results and Med Rec Status.

## 2020-12-29 NOTE — ROUTINE PROCESS
7405  Bedside shift change report given to Jammie SALMON RN (oncoming nurse) by Ana Vega RN (offgoing nurse). Report included the following information SBAR, Kardex, Intake/Output and MAR.

## 2020-12-29 NOTE — ROUTINE PROCESS
Bedside and Verbal shift change report given to Luz Boyd RN (oncoming nurse) by Selina Rockwell RN (offgoing nurse). Report included the following information SBAR and Kardex.

## 2020-12-29 NOTE — ROUTINE PROCESS
Bedside and Verbal shift change report given to Nedra Jimenez RN (oncoming nurse) by Robson Esteban RN (offgoing nurse). Report included the following information SBAR and Kardex.

## 2020-12-29 NOTE — PROGRESS NOTES
Problem: Falls - Risk of  Goal: *Absence of Falls  Description: Document Salvador Cease Fall Risk and appropriate interventions in the flowsheet.   Outcome: Progressing Towards Goal  Note: Fall Risk Interventions:  Mobility Interventions: Communicate number of staff needed for ambulation/transfer  Medication Interventions: Patient to call before getting OOB, Teach patient to arise slowly, Evaluate medications/consider consulting pharmacy  Elimination Interventions: Call light in reach           Problem: Pain  Goal: *Control of Pain  Outcome: Progressing Towards Goal

## 2020-12-29 NOTE — ROUTINE PROCESS
1930  Bedside and verbal shift change report given to [facility] by Bre Franks RN. Report included SBAR, kardex, MAR, and recent results.

## 2020-12-29 NOTE — PROGRESS NOTES
Hospitalist Progress Note-critical care note     Patient: Elke Hinkle MRN: [id number]  CSN: [id number]    YOB: 1960  Age: 56 y.o. Sex: male    DOA: 12/19/2020 LOS:  LOS: 10 days            Chief complaint: anemia , distended abdomen, hypotension elevated trop     Assessment/Plan         Hospital Problems  Never Reviewed          Codes Class Noted POA    * (Principal) Intra abdominal hemorrhage ICD-10-CM: R58  ICD-9-CM: 459.0  12/24/2020 Unknown        Hematoma ICD-10-CM: T14. 8XXA  ICD-9-CM: 924.9  12/22/2020 Unknown        Distended abdomen ICD-10-CM: R14.0  ICD-9-CM: 787.3  12/20/2020 Unknown        Anemia ICD-10-CM: D64.9  ICD-9-CM: 285.9  12/20/2020 Unknown        ARF (acute renal failure) ([facility] [address] ICD-10-CM: N17.9  ICD-9-CM: 584.9  12/19/2020 Unknown        Hypotension ICD-10-CM: I95.9  ICD-9-CM: 458.9  12/19/2020 Unknown        Elevated troponin ICD-10-CM: R77.8  ICD-9-CM: 790.6  12/19/2020 Unknown              Pt presented fatigue on admission, he was found Olivia -he did took nsaids for 2 dose last Wednesday, renal on board. He reported his abdomen had been distended and no abdomen pain. Ct scan hemoperitoneum-repeated ct stable. cta hold due to olivia, surgeon on board        Acute renal failure -  Resolved per iv hydration      Hypotension  Resolved     Hypoxia -  Resolved  CXR wnl   Cannot get CTA chest because of creatinine   V/Q scan: WNL     Distended abdomen [place]    Last ct abdomen done on 12/26-mesenteric hematoma,  Appreciated vascular on board  Case discussed with Dr. Susanna Nicole a      Elevated troponin -  Presenting troponin 0.19-trending down to 0.08, no chest pain, no acs -due to olivia   Echo: ef wnl and and The left ventricular wall motion is normal.      Anemia   Respond to blood transfusion   H/h monitoring     Subjective:  Ok to have cta done , no pcp     Disposition :tbd,   Review of systems:    General: No fevers or chills.   Cardiovascular: No chest pain or pressure. No palpitations. Pulmonary: No shortness of breath. Gastrointestinal: No nausea, vomiting. Vital signs/Intake and Output:  Visit Vitals  /63   Pulse 89   Temp 98.5 °F (36.9 °C)   Resp 16   Ht 6' 0.01\" (1.829 m)   Wt 100.8 kg (222 lb 3.6 oz)   SpO2 98%   BMI 30.13 kg/m²     Current Shift:  12/29 0701 - 12/29 1900  In: -   Out: 250 [Urine:250]  Last three shifts:  12/27 1901 - 12/29 0700  In: 0035 [P.O.:970; I.V.:450]  Out: 1150 [Urine:1150]    Physical Exam:  General: WD, WN. Alert, cooperative, no acute distress    HEENT: NC, Atraumatic. PERRLA, anicteric sclerae. Lungs: CTA Bilaterally. No Wheezing/Rhonchi/Rales. Heart:  Regular  rhythm,  No murmur, No Rubs, No Gallops  Abdomen: Soft, +distended, Non tender. +Bowel sounds,   Extremities: No c/c/e  Psych:   Not anxious or agitated. Neurologic:  No acute neurological deficit. Labs: Results:       Chemistry Recent Labs     12/28/20 0115 12/27/20  0026   GLU 94 104*    142   K 4.2 4.1    111   CO2 27 27   BUN 21* 23*   CREA 1.20 1.18   CA 8.6 8.2*   AGAP 8 4   BUCR 18 19    102   TP 6.1* 5.8*   ALB 2.5* 2.4*   GLOB 3.6 3.4   AGRAT 0.7* 0.7*      CBC w/Diff Recent Labs     12/28/20 0115 12/27/20  0026   WBC 8.5 7.7   RBC 2.83* 2.88*   HGB 7.9* 8.2*   HCT 24.6* 25.0*    254   GRANS 67 60   LYMPH 24 31   EOS 2 2      Cardiac Enzymes No results for input(s): CPK, CKND1, NISSA in the last 72 hours. No lab exists for component: CKRMB, TROIP   Coagulation No results for input(s): PTP, INR, APTT, INREXT, INREXT in the last 72 hours. Lipid Panel No results found for: CHOL, CHOLPOCT, CHOLX, CHLST, CHOLV, 559217, HDL, HDLP, LDL, LDLC, DLDLP, 426453, VLDLC, VLDL, TGLX, TRIGL, TRIGP, TGLPOCT, CHHD, CHHDX   BNP No results for input(s): BNPP in the last 72 hours.    Liver Enzymes Recent Labs     12/28/20  0115   TP 6.1*   ALB 2.5*         Thyroid Studies No results found for: T4, T3U, TSH, TSHEXT, TSHEXT Procedures/imaging: see electronic medical records for all procedures/Xrays and details which were not copied into this note but were reviewed prior to creation of Plan    Nm Lung Vent/perf    Result Date: 12/19/2020  History: Pulmonary embolus. Dose: 6.6 mCi 99 technetium MAA with left antecubital fossa injection site. This is a perfusion only study. There is no chest x-ray for comparison. The distribution of the radiotracer with the 8 different views post injection of the perfusion imaging material demonstrates physiologic distribution. .     IMPRESSION: Normal ventilation only lung scan    Xr Wrist Rt Ap/lat/obl Min 3v    Result Date: 12/18/2020  History: Swelling. No trauma. COMPARISON: None. 3 views of the right wrist demonstrate degenerative joint space narrowing with osteophyte formation at the first metacarpal/carpal joint. This appears moderately severe. No acute fracture. No dislocation. Bone density normal. There may be some soft tissue swelling dorsum of the hand and wrist. This is nonspecific. IMPRESSION: 1. 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 No fracture. Ct Head Wo Cont    Result Date: 12/18/2020  EXAM: CT HEAD, WITHOUT IV CONTRAST COMPARISON: None INDICATION: Possible seizure TECHNIQUE: Multiple axial CT images of the head were obtained extending from the skull base through the vertex. Additional coronal and sagittal reformations were also performed. One or more dose reduction techniques were used on this CT: automated exposure control, adjustment of the mAs and/or kVp according to patient size, and iterative reconstruction techniques. The specific techniques used on this CT exam have been documented in the patient's electronic medical record.   Digital Imaging and Communications in Medicine (DICOM) format image data are available to nonaffiliated external healthcare facilities or entities on a secure, media free, reciprocally searchable basis with patient authorization for at least a 12-month period after this study. _______________ FINDINGS: BRAIN AND POSTERIOR FOSSA: The sulci, folia, ventricles and basal cisterns are within normal limits for the patient's age. There is no intracranial hemorrhage, mass effect, or midline shift. There are no areas of abnormal parenchymal attenuation. EXTRA-AXIAL SPACES AND MENINGES: There are no abnormal extra-axial fluid collections. CALVARIUM: No acute osseous abnormality. OTHER: The visualized portions of the paranasal sinuses and mastoid air cells are clear. _______________     IMPRESSION: No CT evidence of an acute intracranial abnormality - in particular, no acute cortical infarct, hemorrhage, or mass effect. Ct Abd Pelv Wo Cont    Result Date: 12/20/2020  EXAM: CT of the abdomen and pelvis CLINICAL INDICATION/HISTORY: abdomen distended, dilcia r/o obstruction   > Additional: None. COMPARISON: None. > Reference Exam: None. TECHNIQUE: Axial CT imaging of the abdomen and pelvis was performed without intravenous contrast. Multiplanar reformats were generated. One or more dose reduction techniques were used on this CT: automated exposure control, adjustment of the mAs and/or kVp according to patient size, and iterative reconstruction techniques. The specific techniques used on this CT exam have been documented in the patient's electronic medical record. Digital Imaging and Communications in Medicine (DICOM) format image data are available to nonaffiliated external healthcare facilities or entities on a secure, media free, reciprocally searchable basis with patient authorization for at least a 12-month period after this study. _______________ FINDINGS: LOWER CHEST: Small right pleural effusion and atelectasis with respiratory motion in the lung bases. Partially visualized bilateral gynecomastia. Low-attenuation of the blood pool suggesting anemia. LIVER, BILIARY: Diffuse low-attenuation of the liver compatible with steatosis.  No biliary dilation. Gallbladder is unremarkable. PANCREAS: Normal. SPLEEN: Normal. ADRENALS: Normal. KIDNEYS/URETERS/BLADDER: Bell catheter in place within the urinary bladder. Several locules of gas in keeping with recent image mentation. Unenhanced kidneys are unremarkable. LYMPH NODES: No enlarged lymph nodes. GASTROINTESTINAL TRACT: Assessment is limited given lack of contrast. There is suggestion of abnormal wall thickening involving the duodenum. The stomach is partially distended and fluid-filled with an air-fluid level. In addition, there is liquid stool throughout the colon to the rectum, compatible with nonspecific diarrheal illness. PELVIC ORGANS: Small amount of hemoperitoneum extends into the pelvis. VASCULATURE: Unremarkable. BONES: No acute or aggressive osseous abnormalities identified. Degenerative changes are noted including bridging osteophyte formation about both SI joints. OTHER: High attenuating fluid along the inferior right hepatic lobe extending into the central mesentery is noted which measures 50-60 Hounsfield units compatible with hemoperitoneum. _______________     IMPRESSION: 1. Mild to moderate volume of hemoperitoneum of uncertain etiology tracking along the inferior right hepatic lobe and into the central mesentery with a small amount of pelvic free fluid as well. 2. Inflammatory changes in the duodenum as well as distended and partially fluid-filled stomach and liquid stool throughout the colon may represent gastroenteritis and nonspecific diarrheal illness and may be related to the above-described pneumoperitoneum. Superimposed peptic ulcer disease not excluded. No evidence of pneumatosis or free air, however. Us Retroperitoneum Comp    Result Date: 12/20/2020  EXAM: Complete retroperitoneal ultrasound INDICATION: Pain. COMPARISON: None. _______________ FINDINGS: RIGHT KIDNEY: 9.9 cm in length. No hydronephrosis. No solid renal mass. No visible calculi.  Normal renal echotexture and cortical thickness. LEFT KIDNEY: 11.4 cm in length. No hydronephrosis. No solid renal mass. No visible calculi. Normal renal echotexture and cortical thickness. BLADDER: Bladder is decompressed with a Bell catheter in place. OTHER: Prostate was not adequately visualized. _______________     IMPRESSION:  Unremarkable ultrasound of the kidneys. Bell catheter in place within the decompressed urinary bladder. Xr Chest Port    Result Date: 12/19/2020  History: Short of breath. COMPARISON: None. Portable chest x-ray performed. There is elevation of the right hemidiaphragm which is nonspecific. No focal infiltrate, pneumothorax or effusion. Heart size appears normal. Mediastinum appears normal. No skeletal abnormalities. IMPRESSION: 1. No acute pulmonary disease.       Narciso Trejo MD

## 2020-12-30 VITALS
SYSTOLIC BLOOD PRESSURE: 127 MMHG | HEART RATE: 91 BPM | WEIGHT: 222.22 LBS | HEIGHT: 72 IN | OXYGEN SATURATION: 96 % | DIASTOLIC BLOOD PRESSURE: 66 MMHG | RESPIRATION RATE: 16 BRPM | BODY MASS INDEX: 30.1 KG/M2 | TEMPERATURE: 97.8 F

## 2020-12-30 LAB
ANION GAP SERPL CALC-SCNC: 4 MMOL/L (ref 3–18)
BASOPHILS # BLD: 0 K/UL (ref 0–0.1)
BASOPHILS NFR BLD: 0 % (ref 0–2)
BUN SERPL-MCNC: 18 MG/DL (ref 7–18)
BUN/CREAT SERPL: 18 (ref 12–20)
CALCIUM SERPL-MCNC: 8.4 MG/DL (ref 8.5–10.1)
CHLORIDE SERPL-SCNC: 109 MMOL/L (ref 100–111)
CO2 SERPL-SCNC: 28 MMOL/L (ref 21–32)
CREAT SERPL-MCNC: 1.02 MG/DL (ref 0.6–1.3)
DIFFERENTIAL METHOD BLD: ABNORMAL
EOSINOPHIL # BLD: 0 K/UL (ref 0–0.4)
EOSINOPHIL NFR BLD: 0 % (ref 0–5)
ERYTHROCYTE [DISTWIDTH] IN BLOOD BY AUTOMATED COUNT: 14.3 % (ref 11.6–14.5)
GLUCOSE SERPL-MCNC: 100 MG/DL (ref 74–99)
HCT VFR BLD AUTO: 28 % (ref 36–48)
HGB BLD-MCNC: 8.8 G/DL (ref 13–16)
LYMPHOCYTES # BLD: 2.1 K/UL (ref 0.9–3.6)
LYMPHOCYTES NFR BLD: 18 % (ref 21–52)
MCH RBC QN AUTO: 27.9 PG (ref 24–34)
MCHC RBC AUTO-ENTMCNC: 31.4 G/DL (ref 31–37)
MCV RBC AUTO: 88.9 FL (ref 74–97)
MONOCYTES # BLD: 0.5 K/UL (ref 0.05–1.2)
MONOCYTES NFR BLD: 4 % (ref 3–10)
NEUTS SEG # BLD: 9.1 K/UL (ref 1.8–8)
NEUTS SEG NFR BLD: 78 % (ref 40–73)
PLATELET # BLD AUTO: 249 K/UL (ref 135–420)
PMV BLD AUTO: 10.5 FL (ref 9.2–11.8)
POTASSIUM SERPL-SCNC: 4.2 MMOL/L (ref 3.5–5.5)
RBC # BLD AUTO: 3.15 M/UL (ref 4.7–5.5)
RBC MORPH BLD: ABNORMAL
SODIUM SERPL-SCNC: 141 MMOL/L (ref 136–145)
WBC # BLD AUTO: 11.7 K/UL (ref 4.6–13.2)

## 2020-12-30 PROCEDURE — 80048 BASIC METABOLIC PNL TOTAL CA: CPT

## 2020-12-30 PROCEDURE — 85025 COMPLETE CBC W/AUTO DIFF WBC: CPT

## 2020-12-30 PROCEDURE — 36415 COLL VENOUS BLD VENIPUNCTURE: CPT

## 2020-12-30 PROCEDURE — 74011250637 HC RX REV CODE- 250/637: Performed by: HOSPITALIST

## 2020-12-30 RX ADMIN — Medication 10 ML: at 11:17

## 2020-12-30 RX ADMIN — PANTOPRAZOLE SODIUM 40 MG: 40 TABLET, DELAYED RELEASE ORAL at 06:39

## 2020-12-30 NOTE — PROGRESS NOTES
Discussed with CM yesterday for f/u clinic, so far not set-up yet. Talked with pt, he  was given [facility] phone number  Per CM. he will call. Discussed the case several times for the f/u appointment since yesterday. Recommend CM to call [facility] clinic on Monday also need to inform pt. Pt needs to go to the earlier appointment.

## 2020-12-30 NOTE — PROGRESS NOTES
Bedside and Verbal shift change report given to Cosme Phalen, RN (oncoming nurse) by STUART Tobar RN (offgoing nurse). Report included the following information SBAR, Kardex, Intake/Output, MAR and Recent Results.

## 2020-12-30 NOTE — PROGRESS NOTES
Problem: Falls - Risk of  Goal: *Absence of Falls  Description: Document Robyn Garcia Fall Risk and appropriate interventions in the flowsheet.   12/30/2020 1504 by Alfred Flowers RN  Outcome: Resolved/Met  Note: Fall Risk Interventions:  Mobility Interventions: Patient to call before getting OOB         Medication Interventions: Patient to call before getting OOB    Elimination Interventions: Call light in reach           12/30/2020 1447 by Alfred Flowers RN  Outcome: Progressing Towards Goal  Note: Fall Risk Interventions:  Mobility Interventions: Patient to call before getting OOB         Medication Interventions: Patient to call before getting OOB    Elimination Interventions: Call light in reach              Problem: Patient Education: Go to Patient Education Activity  Goal: Patient/Family Education  12/30/2020 1504 by Alfred Flowers RN  Outcome: Resolved/Met  12/30/2020 1447 by Alfred Flowers RN  Outcome: Progressing Towards Goal     Problem: Pain  Goal: *Control of Pain  12/30/2020 1504 by Alfred Flowers RN  Outcome: Resolved/Met  12/30/2020 1447 by Alfred Flowers RN  Outcome: Progressing Towards Goal  Goal: *PALLIATIVE CARE:  Alleviation of Pain  12/30/2020 1504 by Alfred Flowers RN  Outcome: Resolved/Met  12/30/2020 1447 by Alfred Flowers RN  Outcome: Progressing Towards Goal     Problem: Patient Education: Go to Patient Education Activity  Goal: Patient/Family Education  12/30/2020 1504 by Alfred Flowers RN  Outcome: Resolved/Met  12/30/2020 1447 by Alfred Flowers RN  Outcome: Progressing Towards Goal     Problem: Breathing Pattern - Ineffective  Goal: *Absence of hypoxia  12/30/2020 1504 by Alfred Flowers RN  Outcome: Resolved/Met  12/30/2020 1447 by Alfred Flowers RN  Outcome: Progressing Towards Goal  Goal: *Use of effective breathing techniques  12/30/2020 1504 by Alfred Flowers RN  Outcome: Resolved/Met  12/30/2020 1447 by Alfred Flowers RN  Outcome: Progressing Towards Goal  Goal: *PALLIATIVE CARE:  Alleviation of Dyspnea  12/30/2020 1504 by Javi Yang RN  Outcome: Resolved/Met  12/30/2020 1447 by Javi Yang RN  Outcome: Progressing Towards Goal     Problem: Patient Education: Go to Patient Education Activity  Goal: Patient/Family Education  12/30/2020 1504 by Javi Yang RN  Outcome: Resolved/Met  12/30/2020 1447 by Javi Yang RN  Outcome: Progressing Towards Goal     Problem: Anemia Care Plan (Adult and Pediatric)  Goal: *Labs within defined limits  12/30/2020 1504 by Javi Yang RN  Outcome: Resolved/Met  12/30/2020 1447 by Javi Yang RN  Outcome: Progressing Towards Goal  Goal: *Tolerates increased activity  12/30/2020 1504 by Javi Yang RN  Outcome: Resolved/Met  12/30/2020 1447 by Javi Yang RN  Outcome: Progressing Towards Goal     Problem: Patient Education: Go to Patient Education Activity  Goal: Patient/Family Education  12/30/2020 1504 by Javi Yang RN  Outcome: Resolved/Met  12/30/2020 1447 by Javi Yang RN  Outcome: Progressing Towards Goal     Problem: Patient Education: Go to Patient Education Activity  Goal: Patient/Family Education  12/30/2020 1504 by Javi Yang RN  Outcome: Resolved/Met  12/30/2020 1447 by Javi Yang RN  Outcome: Progressing Towards Goal

## 2020-12-30 NOTE — PROGRESS NOTES
DC Plan:  Home with follow up with [facility] clinic or [facility] clinic    Met with patient; no HH per MD; as patient has no insurance, will need to follow up with either [facility] which has 2 week delay to see patients or [facility] clinic; per patient he prefers [facility] but CMS will schedule appointment with [facility] as well and patient can cancel if he can get seen at [facility] clinic sooner.

## 2020-12-30 NOTE — DISCHARGE SUMMARY
Discharge Summary    Patient: Leeroy Becker MRN: [id number]  CSN: [id number]    YOB: 1960  Age: 56 y.o. Sex: male    DOA: 12/19/2020 LOS:  LOS: 11 days   Discharge Date:      Primary Care Provider:  Other, MD Sriram    Admission Diagnoses: ARF (acute renal failure) ([facility] [N17.9]  Hypotension [I95.9]    Discharge Diagnoses:    Hospital Problems  Never Reviewed          Codes Class Noted POA    * (Principal) Intra abdominal hemorrhage ICD-10-CM: R58  ICD-9-CM: 459.0  12/24/2020 Unknown        Hematoma ICD-10-CM: T14. 8XXA  ICD-9-CM: 924.9  12/22/2020 Unknown        Distended abdomen ICD-10-CM: R14.0  ICD-9-CM: 787.3  12/20/2020 Unknown        Anemia ICD-10-CM: D64.9  ICD-9-CM: 285.9  12/20/2020 Unknown        ARF (acute renal failure) ([facility] ICD-10-CM: N17.9  ICD-9-CM: 584.9  12/19/2020 Unknown        Hypotension ICD-10-CM: I95.9  ICD-9-CM: 458.9  12/19/2020 Unknown        Elevated troponin ICD-10-CM: R77.8  ICD-9-CM: 790.6  12/19/2020 Unknown              Discharge Condition: stable     Discharge Medications:     Current Discharge Medication List      STOP taking these medications       predniSONE (DELTASONE) 20 mg tablet Comments:   Reason for Stopping:               Procedures : none     Consults: Nephrology and Vascular Surgery      PHYSICAL EXAM   Visit Vitals  /66 (BP 1 Location: Left arm, BP Patient Position: At rest)   Pulse 91   Temp 97.8 °F (36.6 °C)   Resp 16   Ht 6' 0.01\" (1.829 m)   Wt 100.8 kg (222 lb 3.6 oz)   SpO2 96%   BMI 30.13 kg/m²     General: Awake, cooperative, no acute distress    HEENT: NC, Atraumatic. PERRLA, EOMI. Anicteric sclerae. Lungs:  CTA Bilaterally. No Wheezing/Rhonchi/Rales. Heart:  Regular  rhythm,  No murmur, No Rubs, No Gallops  Abdomen: Soft, Non distended, Non tender. +Bowel sounds,   Extremities: No c/c/e  Psych:   Not anxious or agitated. Neurologic:  No acute neurological deficits.                                      Admission HPI :   Lucina Sears Brodie Cain is a 56 y.o. male who was followed at least briefly by [facility] in 2017. Was evaluated last night for acute seizure and sent home after ED physician discussion with neurologist. Joan Senate home and felt very fatigued with very low energy, malaise and anorexia. He presented to the ED because he and his family were concerned about dehydration. In the ED, pt's also found to have low oxygen 87% which improved to high 90s on 3 liters. Also, in the ED, pt was found to be in acute renal failure with an initial creatinine of 5.68. Pt denies fever, chills, nightsweats, nausea, vomiting or loose stools.        Hospital Course :     Pt presented fatigue on admission, he was found Olivia      Acute renal failure -  Resolved per iv hydration, nephrologist on board.      Hypotension  Resolved per iv hydration      Hypoxia -  Resolved, CXR wnl, V/Q scan: WNL . He continued remain O2 sat >96.      Distended abdomen /hematoma    Repeated  ct abdomen hematoma stable. cta done, not sure source of bleeding. IR/surgery/vascular service has been consulted, no further intervention needed and his h/h continue stable      Elevated troponin -  Presenting troponin 0.19-trending down to 0.08, no chest pain, no acs -due to olivia   Echo: ef wnl and and The left ventricular wall motion is normal.        Anemia   Received one  blood transfusion and H/h monitoring     Before discharge, length discussed with pt about avoid lifting/trauma/nasids/aspirn -all other medication with potential causing bleeding/exercise. Also recommend to come back to ER if any abdomen pain/sob/ dizziness ----. He indicated a verbal understanding. CM will continue make appointment for him on coming Monday      Discharge planning discussed with patient, pt agrees  with the plan and no questions and concerns at this point.        Activity: No lifting, Driving, or Strenuous exercise for 3 months     Diet: Regular Diet    Follow-up: PCP     Disposition: home Minutes spent on discharge: 45 min       Labs: Results:       Chemistry Recent Labs     12/30/20  0900 12/28/20  0115   * 94    144   K 4.2 4.2    109   CO2 28 27   BUN 18 21*   CREA 1.02 1.20   CA 8.4* 8.6   AGAP 4 8   BUCR 18 18   AP  --  113   TP  --  6.1*   ALB  --  2.5*   GLOB  --  3.6   AGRAT  --  0.7*      CBC w/Diff Recent Labs     12/30/20  1226 12/28/20  0115   WBC 11.7 8.5   RBC 3.15* 2.83*   HGB 8.8* 7.9*   HCT 28.0* 24.6*    261   GRANS PENDING 67   LYMPH PENDING 24   EOS PENDING 2      Cardiac Enzymes No results for input(s): CPK, CKND1, NISSA in the last 72 hours. No lab exists for component: CKRMB, TROIP   Coagulation No results for input(s): PTP, INR, APTT, INREXT in the last 72 hours. Lipid Panel No results found for: CHOL, CHOLPOCT, CHOLX, CHLST, CHOLV, 858859, HDL, HDLP, LDL, LDLC, DLDLP, 329765, VLDLC, VLDL, TGLX, TRIGL, TRIGP, TGLPOCT, CHHD, CHHDX   BNP No results for input(s): BNPP in the last 72 hours. Liver Enzymes Recent Labs     12/28/20  0115   TP 6.1*   ALB 2.5*         Thyroid Studies No results found for: T4, T3U, TSH, TSHEXT       @micro    Significant Diagnostic Studies: Nm Lung Scan Perf    Result Date: 12/19/2020  History: Pulmonary embolus. Dose: 6.6 mCi 99 technetium MAA with left antecubital fossa injection site. This is a perfusion only study. There is no chest x-ray for comparison. The distribution of the radiotracer with the 8 different views post injection of the perfusion imaging material demonstrates physiologic distribution. .     IMPRESSION: Normal ventilation only lung scan    Xr Wrist Rt Ap/lat    Result Date: 12/27/2020  EXAM: Wrist Series  Indication: Right wrist pain Technique: Frontal, oblique, and lateral views of the right wrist. Comparison studies: 12/18/2020. _____________ FINDINGS: -OSSEOUS: No acute fracture or dislocation.     Stable multifocal DJD with advanced degenerative changes first carpometacarpal and moderate severe changes of the first MCP joint. Additional degenerative changes of the radiocarpal joint with degenerative subchondral cyst formation of the capitate. -SOFT TISSUES: Unremarkable. _____________    IMPRESSION: 1. Stable exam. No acute fracture or dislocation. 2. Multifocal DJD, greatest at the first carpometacarpal joint. Xr Wrist Rt Ap/lat/obl Min 3v    Result Date: 12/18/2020  History: Swelling. No trauma. COMPARISON: None. 3 views of the right wrist demonstrate degenerative joint space narrowing with osteophyte formation at the first metacarpal/carpal joint. This appears moderately severe. No acute fracture. No dislocation. Bone density normal. There may be some soft tissue swelling dorsum of the hand and wrist. This is nonspecific. IMPRESSION: 1. 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 No fracture. Ct Head Wo Cont    Result Date: 12/18/2020  EXAM: CT HEAD, WITHOUT IV CONTRAST COMPARISON: None INDICATION: Possible seizure TECHNIQUE: Multiple axial CT images of the head were obtained extending from the skull base through the vertex. Additional coronal and sagittal reformations were also performed. One or more dose reduction techniques were used on this CT: automated exposure control, adjustment of the mAs and/or kVp according to patient size, and iterative reconstruction techniques. The specific techniques used on this CT exam have been documented in the patient's electronic medical record. Digital Imaging and Communications in Medicine (DICOM) format image data are available to nonaffiliated external healthcare facilities or entities on a secure, media free, reciprocally searchable basis with patient authorization for at least a 12-month period after this study. _______________ FINDINGS: BRAIN AND POSTERIOR FOSSA: The sulci, folia, ventricles and basal cisterns are within normal limits for the patient's age. There is no intracranial hemorrhage, mass effect, or midline shift.  There are no areas of abnormal parenchymal attenuation. EXTRA-AXIAL SPACES AND MENINGES: There are no abnormal extra-axial fluid collections. CALVARIUM: No acute osseous abnormality. OTHER: The visualized portions of the paranasal sinuses and mastoid air cells are clear. _______________     IMPRESSION: No CT evidence of an acute intracranial abnormality - in particular, no acute cortical infarct, hemorrhage, or mass effect. Ct Abd Pelv Wo Cont    Result Date: 12/26/2020  EXAM: CT of the abdomen and pelvis INDICATION: Follow-up of intra-abdominal hemorrhage/hematoma COMPARISON: 12/21/2020, 12/20/2020 TECHNIQUE: Axial CT imaging of the abdomen and pelvis was performed with oral but, without intravenous contrast. Multiplanar reformats were generated. One or more dose reduction techniques were used on this CT: automated exposure control, adjustment of the mAs and/or kVp according to patient size, and iterative reconstruction techniques. The specific techniques used on this CT exam have been documented in the patient's electronic medical record. Digital Imaging and Communications in Medicine (DICOM) format image data are available to nonaffiliated external healthcare facilities or entities on a secure, media free, reciprocally searchable basis with patient authorization for at least a 12-month period after this study. _______________ FINDINGS: LOWER CHEST: Interval improved aeration with minimal right posterior diaphragmatic atelectasis and likely trace residual effusion. LIVER, BILIARY: Stable hepatomegaly and steatosis with no acute finding. No biliary dilation. Gallbladder is unremarkable. PANCREAS: Unremarkable on noncontrast imaging SPLEEN: Normal. ADRENALS: Normal. KIDNEYS, URETERS, BLADDER: Isoattenuating bilateral kidneys without hydronephrosis. No hydroureter. Decompressed bladder GASTROINTESTINAL TRACT: Oral contrast opacifies the stomach, portions of small bowel loops extending to the colon and rectum.  Mild persistent gastrojejunal mural thickening. Stable mild edematous thickening of the mesenteric portion of the transverse colon from the hepatic flexure to mid segment. No bowel dilatation.   > Right mesenteric hemoperitoneum, without significant change to prior exams dating back to 12/20/2020, with low-density and intermediate to high density components. The largest component measures 12.6 x 11 cm, previously 13.5 x 8.4 cm and 14.3 x 10.9 cm respectively. Adjacent mesenteric stranding, similar prior exams. Hematoma extension to the proximal third segment duodenum at the posterior inferior medial margin and is of hepatic right upper quadrant. Retroperitoneal stranding/hematoma extending to the right of midline upper pelvis. LYMPH NODES: No enlarged lymph nodes. PELVIC ORGANS: Unremarkable. VASCULATURE: Unremarkable. OSSEOUS: No acute or aggressive osseous abnormalities identified. OTHER: None. _______________     IMPRESSION: 1.  Stable moderate to large size right upper abdominal mesenteric hematoma, with mild extension to the right of midline upper pelvis. No significant interval change given interval differences in positioning between the prior studies from 12/20/2020. 2. Mild persistent edematous gastroduodenal and transverse colonic mural edema, considerations would include reactive etiology relating to the adjacent hematoma. No findings of large or small bowel obstruction. 3. Minimal right posterior basilar atelectasis with trace residual pleural effusion, significantly improved prior exam.     Ct Abd Pelv Wo Cont    Addendum Date: 12/21/2020    Addendum: Impressions showed also include, Prominent duodenal wall thickening without significant change, most commonly duodenitis.     Result Date: 12/21/2020  EXAM: CT of the abdomen and pelvis INDICATION: Follow-up intra-abdominal hemorrhage COMPARISON: 79/98/7620 TECHNIQUE: Helical volumetric scanning through the abdomen and pelvis was performed without oral, with nonionic intravenous contrast.  Axial, coronal and sagittal reconstructions were created. One or more dose reduction techniques were used on this CT: automated exposure control, adjustment of the mAs and/or kVp according to patient size, and iterative reconstruction techniques. The specific techniques used on this CT exam have been documented in the patient's electronic medical record. Digital Imaging and Communications in Medicine (DICOM) format image data are available to nonaffiliated external healthcare facilities or entities on a secure, media free, reciprocally searchable basis with patient authorization for at least a 12-month period after this study. _______________ FINDINGS: LOWER CHEST: Small bilateral posteriorly layering pleural effusions are present, right larger than left, with slight increase. Mild adjacent passive atelectasis is present in both lung bases. Visualized distal esophagus demonstrates mild wall thickening. LIVER, BILIARY: Liver is diffusely quite low density compatible with hepatic steatosis as previously seen. No suspicious liver lesions. No biliary dilation. Gallbladder is unremarkable. PANCREAS: Normal. SPLEEN: Normal. ADRENALS: Normal. KIDNEYS: Normal. LYMPH NODES: No enlarged lymph nodes. GASTROINTESTINAL TRACT: Mild wall thickening of the distal stomach is present. Prominent wall thickening of the duodenum is present from the apex of the bulb through the second portion of the duodenum, less prominently compressed the transverse duodenum, without significant changes. Interval passage of most of the fluid from the colon. Hemoperitoneum is again identified, with the largest component in the upper central and right abdomen, measuring 13.5 x 8.4 cm (previously 14.3 x 10.9 cm).  Slightly high density fluid is present on both sides of the transverse duodenum, suggesting retroperitoneal hematoma, extending inferiorly in the midline and to the right of midline to the upper pelvis, appearing similar. High density fluid is present in the inferior pelvis measuring 7.6 x 4.2 cm, previously minimal. Overall volume of pneumoperitoneum is similar to the prior exam, likely shifting from the abdomen to the more dependent pelvis. PELVIC ORGANS: A Bell catheter decompresses the urinary bladder. Air is present in the bladder likely due to the presence of the Bell. VASCULATURE: Negative. BONES: No acute or aggressive osseous abnormalities identified. OTHER: None. _______________     IMPRESSION: 1. Moderate degree of hemoperitoneum, greatest in the upper abdomen, with overall volume similar, shifting of some of the hemoperitoneum from the upper abdomen to the pelvis. 2. Small degree of retroperitoneal hematoma without change. The etiology of hemorrhage is not determined by this study. 3. Near complete evacuation of previous diffuse colonic fluid. 4. Small right greater than left pleural effusions, presumably sympathetic, with slight progression and with adjacent atelectasis. *  *   *    Ct Abd Pelv Wo Cont    Result Date: 12/20/2020  EXAM: CT of the abdomen and pelvis CLINICAL INDICATION/HISTORY: abdomen distended, dilcia r/o obstruction   > Additional: None. COMPARISON: None. > Reference Exam: None. TECHNIQUE: Axial CT imaging of the abdomen and pelvis was performed without intravenous contrast. Multiplanar reformats were generated. One or more dose reduction techniques were used on this CT: automated exposure control, adjustment of the mAs and/or kVp according to patient size, and iterative reconstruction techniques. The specific techniques used on this CT exam have been documented in the patient's electronic medical record. Digital Imaging and Communications in Medicine (DICOM) format image data are available to nonaffiliated external healthcare facilities or entities on a secure, media free, reciprocally searchable basis with patient authorization for at least a 12-month period after this study. _______________ FINDINGS: LOWER CHEST: Small right pleural effusion and atelectasis with respiratory motion in the lung bases. Partially visualized bilateral gynecomastia. Low-attenuation of the blood pool suggesting anemia. LIVER, BILIARY: Diffuse low-attenuation of the liver compatible with steatosis. No biliary dilation. Gallbladder is unremarkable. PANCREAS: Normal. SPLEEN: Normal. ADRENALS: Normal. KIDNEYS/URETERS/BLADDER: Bell catheter in place within the urinary bladder. Several locules of gas in keeping with recent image mentation. Unenhanced kidneys are unremarkable. LYMPH NODES: No enlarged lymph nodes. GASTROINTESTINAL TRACT: Assessment is limited given lack of contrast. There is suggestion of abnormal wall thickening involving the duodenum. The stomach is partially distended and fluid-filled with an air-fluid level. In addition, there is liquid stool throughout the colon to the rectum, compatible with nonspecific diarrheal illness. PELVIC ORGANS: Small amount of hemoperitoneum extends into the pelvis. VASCULATURE: Unremarkable. BONES: No acute or aggressive osseous abnormalities identified. Degenerative changes are noted including bridging osteophyte formation about both SI joints. OTHER: High attenuating fluid along the inferior right hepatic lobe extending into the central mesentery is noted which measures 50-60 Hounsfield units compatible with hemoperitoneum. _______________     IMPRESSION: 1. Mild to moderate volume of hemoperitoneum of uncertain etiology tracking along the inferior right hepatic lobe and into the central mesentery with a small amount of pelvic free fluid as well. 2. Inflammatory changes in the duodenum as well as distended and partially fluid-filled stomach and liquid stool throughout the colon may represent gastroenteritis and nonspecific diarrheal illness and may be related to the above-described pneumoperitoneum. Superimposed peptic ulcer disease not excluded.  No evidence of pneumatosis or free air, however.    Us Retroperitoneum Comp    Result Date: 12/20/2020  EXAM: Complete retroperitoneal ultrasound INDICATION: Pain. COMPARISON: None. _______________ FINDINGS: RIGHT KIDNEY: 9.9 cm in length. No hydronephrosis.  No solid renal mass. No visible calculi. Normal renal echotexture and cortical thickness. LEFT KIDNEY: 11.4 cm in length. No hydronephrosis.  No solid renal mass. No visible calculi. Normal renal echotexture and cortical thickness. BLADDER: Bladder is decompressed with a Bell catheter in place. OTHER: Prostate was not adequately visualized. _______________     IMPRESSION:  Unremarkable ultrasound of the kidneys. Bell catheter in place within the decompressed urinary bladder.    Xr Chest Port    Result Date: 12/19/2020  History: Short of breath. COMPARISON: None. Portable chest x-ray performed. There is elevation of the right hemidiaphragm which is nonspecific. No focal infiltrate, pneumothorax or effusion. Heart size appears normal. Mediastinum appears normal. No skeletal abnormalities.     IMPRESSION: 1. No acute pulmonary disease.    Cta Abdomen Pelv W Cont    Result Date: 12/29/2020  EXAM: CTA of the abdomen and pelvis INDICATION: Mesenteric hematoma. COMPARISON: CT abdomen pelvis 12/26/2020 TECHNIQUE: Axial CT imaging of the abdomen and pelvis was performed prior to and following intravenous contrast. Maximum intensity images obtained. Multiplanar reformats were generated. One or more dose reduction techniques were used on this CT: automated exposure control, adjustment of the mAs and/or kVp according to patient size, and iterative reconstruction techniques.  The specific techniques used on this CT exam have been documented in the patient's electronic medical record.  Digital Imaging and Communications in Medicine (DICOM) format image data are available to nonaffiliated external healthcare facilities or entities on a secure, media free, reciprocally searchable basis  with patient authorization for at least a 12-month period after this study. _______________ FINDINGS: VASCULAR: No contrast extravasation to suggest active arterial bleeding. Patent celiac, superior and inferior mesenteric and bilateral renal arteries. LOWER CHEST: Unremarkable. LIVER, BILIARY: Hepatic steatosis. No biliary dilation. Gallbladder is unremarkable. PANCREAS: Normal. SPLEEN: Normal. ADRENALS: Normal. KIDNEYS/URETERS/BLADDER: No calcification, hydronephrosis, or soft tissue attenuation renal mass. PELVIC ORGANS: Unremarkable. VASCULATURE: Unremarkable LYMPH NODES: No enlarged lymph nodes. GASTROINTESTINAL TRACT: Mildly edematous gastroduodenal wall, unchanged from prior study, likely reactive. BONES: No acute or aggressive osseous abnormalities identified. OTHER: Moderate to large mesenteric hematoma centered within the small bowel mesentery at the pancreatic duodenal groove, essentially unchanged from prior study. _______________     IMPRESSION: Moderate to large mesenteric hematoma, essentially unchanged from prior study. No identifiable source for mesenteric hematoma. Mildly edematous gastroduodenal wall, unchanged from prior study, likely reactive. Echo Adult Complete    Result Date: 12/20/2020  · Left Ventricle: Normal cavity size and systolic function (ejection fraction normal). Severe concentric hypertrophy. The estimated EF is 60 - 65%. There is mild (grade 1) left ventricular diastolic dysfunction E/E' ratio is 11.56. Wall Scoring: The left ventricular wall motion is normal. · Pulmonary Artery: Pulmonary arteries not well visualized. Pulmonary arterial systolic pressure (PASP) is 36 mmHg. Pulmonary hypertension found to be mild.               [facility]     CC: Other, MD Sriram

## 2020-12-30 NOTE — PROGRESS NOTES
Problem: Falls - Risk of  Goal: *Absence of Falls  Description: Document Harrison Falcon Fall Risk and appropriate interventions in the flowsheet.   Outcome: Progressing Towards Goal  Note: Fall Risk Interventions:  Mobility Interventions: Patient to call before getting OOB         Medication Interventions: Patient to call before getting OOB    Elimination Interventions: Call light in reach              Problem: Patient Education: Go to Patient Education Activity  Goal: Patient/Family Education  Outcome: Progressing Towards Goal     Problem: Pain  Goal: *Control of Pain  Outcome: Progressing Towards Goal  Goal: *PALLIATIVE CARE:  Alleviation of Pain  Outcome: Progressing Towards Goal     Problem: Patient Education: Go to Patient Education Activity  Goal: Patient/Family Education  Outcome: Progressing Towards Goal     Problem: Breathing Pattern - Ineffective  Goal: *Absence of hypoxia  Outcome: Progressing Towards Goal  Goal: *Use of effective breathing techniques  Outcome: Progressing Towards Goal  Goal: *PALLIATIVE CARE:  Alleviation of Dyspnea  Outcome: Progressing Towards Goal     Problem: Patient Education: Go to Patient Education Activity  Goal: Patient/Family Education  Outcome: Progressing Towards Goal     Problem: Anemia Care Plan (Adult and Pediatric)  Goal: *Labs within defined limits  Outcome: Progressing Towards Goal  Goal: *Tolerates increased activity  Outcome: Progressing Towards Goal     Problem: Patient Education: Go to Patient Education Activity  Goal: Patient/Family Education  Outcome: Progressing Towards Goal     Problem: Patient Education: Go to Patient Education Activity  Goal: Patient/Family Education  Outcome: Progressing Towards Goal

## 2020-12-30 NOTE — DISCHARGE INSTRUCTIONS
Patient Education        Anemia: Care Instructions  Your Care Instructions     Anemia is a low level of red blood cells, which carry oxygen throughout your body. Many things can cause anemia. Lack of iron is one of the most common causes. Your body needs iron to make hemoglobin, a substance in red blood cells that carries oxygen from the lungs to your body's cells. Without enough iron, the body produces fewer and smaller red blood cells. As a result, your body's cells do not get enough oxygen, and you feel tired and weak. And you may have trouble concentrating. Bleeding is the most common cause of a lack of iron. You may have heavy menstrual bleeding or bleeding caused by conditions such as ulcers, hemorrhoids, or cancer. Regular use of aspirin or other anti-inflammatory medicines (such as ibuprofen) also can cause bleeding in some people. A lack of iron in your diet also can cause anemia, especially at times when the body needs more iron, such as during pregnancy, infancy, and the teen years. Your doctor may have prescribed iron pills. It may take several months of treatment for your iron levels to return to normal. Your doctor also may suggest that you eat foods that are rich in iron, such as meat and beans. There are many other causes of anemia. It is not always due to a lack of iron. Finding the specific cause of your anemia will help your doctor find the right treatment for you. Follow-up care is a key part of your treatment and safety. Be sure to make and go to all appointments, and call your doctor if you are having problems. It's also a good idea to know your test results and keep a list of the medicines you take. How can you care for yourself at home? · Take your medicines exactly as prescribed. Call your doctor if you think you are having a problem with your medicine.   · If your doctor recommends iron pills, take them as directed:  ? Try to take the pills on an empty stomach about 1 hour before or 2 hours after meals. But you may need to take iron with food to avoid an upset stomach. ? Do not take antacids or drink milk or caffeine drinks (such as coffee, tea, or cola) at the same time or within 2 hours of the time that you take your iron. They can make it hard for your body to absorb the iron. ? Vitamin C (from food or supplements) helps your body absorb iron. Try taking iron pills with a glass of orange juice or some other food that is high in vitamin C, such as citrus fruits. ? Iron pills may cause stomach problems, such as heartburn, nausea, diarrhea, constipation, and cramps. Be sure to drink plenty of fluids, and include fruits, vegetables, and fiber in your diet each day. Iron pills often make your bowel movements dark or green. ? If you forget to take an iron pill, do not take a double dose of iron the next time you take a pill. ? Keep iron pills out of the reach of small children. An overdose of iron can be very dangerous. · Follow your doctor's advice about eating iron-rich foods. These include red meat, shellfish, poultry, eggs, beans, raisins, whole-grain bread, and leafy green vegetables. · Steam vegetables to help them keep their iron content. When should you call for help? Call 911 anytime you think you may need emergency care. For example, call if:    · You have symptoms of a heart attack. These may include:  ? Chest pain or pressure, or a strange feeling in the chest.  ? Sweating. ? Shortness of breath. ? Nausea or vomiting. ? Pain, pressure, or a strange feeling in the back, neck, jaw, or upper belly or in one or both shoulders or arms. ? Lightheadedness or sudden weakness. ? A fast or irregular heartbeat. After you call 911, the  may tell you to chew 1 adult-strength or 2 to 4 low-dose aspirin. Wait for an ambulance. Do not try to drive yourself.     · You passed out (lost consciousness).    Call your doctor now or seek immediate medical care if:    · You have new or increased shortness of breath.     · You are dizzy or lightheaded, or you feel like you may faint.     · Your fatigue and weakness continue or get worse.     · You have any abnormal bleeding, such as:  ? Nosebleeds. ? Vaginal bleeding that is different (heavier, more frequent, at a different time of the month) than what you are used to.  ? Bloody or black stools, or rectal bleeding. ? Bloody or pink urine. Watch closely for changes in your health, and be sure to contact your doctor if:    · You do not get better as expected. Where can you learn more? Go to http://www.lopez.com/  Enter R301 in the search box to learn more about \"Anemia: Care Instructions. \"  Current as of: November 8, 2019               Content Version: 12.6  © 6275-7230 [employer]. Care instructions adapted under license by [employer] (which disclaims liability or warranty for this information). If you have questions about a medical condition or this instruction, always ask your healthcare professional. [place] [address] any warranty or liability for your use of this information. Patient Education        Anemia From Heavy Bleeding: Care Instructions  Your Care Instructions     Anemia means that your body does not have enough red blood cells. Red blood cells carry oxygen around the body. When you have anemia, you may feel dizzy, tired, and weak. You may also feel your heart pounding. For some people, it's hard to focus and think clearly. One common cause of anemia is bleeding. Bleeding from ulcers, hemorrhoids, cancer, or other problems can cause anemia. It may also be caused by heavy menstrual periods. Your treatment may include iron pills. Iron helps your body make hemoglobin. Hemoglobin is the part of the red blood cell that carries oxygen. If you have severe anemia, you may need a blood transfusion to give you red blood cells as quickly as possible.   Sometimes it takes several months to get iron levels back to normal.  Follow-up care is a key part of your treatment and safety. Be sure to make and go to all appointments, and call your doctor if you are having problems. It's also a good idea to know your test results and keep a list of the medicines you take. How can you care for yourself at home? · Be safe with medicines. Take your medicines exactly as prescribed. Call your doctor if you think you are having a problem with your medicine. · Follow your doctor's advice about eating foods that have a lot of iron in them. These include red meat, shellfish, poultry, and eggs. They also include beans, raisins, whole-grain bread, and leafy green vegetables. · Steam your vegetables. This is the best way to prepare them if you want to get as much iron as possible. · Iron pills can cause constipation. If you take them, there are things you can do to avoid constipation. Drink plenty of fluids, eat foods with a lot of fiber, and exercise every day. When should you call for help? Call 911 anytime you think you may need emergency care. For example, call if:    · You passed out (lost consciousness).     · Your stools are maroon or very bloody. Call your doctor now or seek immediate medical care if:    · You are short of breath.     · You have new or worse bleeding.     · You are dizzy or light-headed, or you feel like you may faint. Watch closely for changes in your health, and be sure to contact your doctor if:    · You feel weaker or more tired than usual.     · You do not get better as expected. Where can you learn more? Go to http://www.gray.com/  Enter I435 in the search box to learn more about \"Anemia From Heavy Bleeding: Care Instructions. \"  Current as of: November 8, 2019               Content Version: 12.6  © 5354-1630 [employer].    Care instructions adapted under license by [employer] (which disclaims liability or warranty for this information). If you have questions about a medical condition or this instruction, always ask your healthcare professional. [place] [address] any warranty or liability for your use of this information.

## 2023-08-09 NOTE — PROGRESS NOTES
Problem: Falls - Risk of  Goal: *Absence of Falls  Description: Document Raheem Natarajan Fall Risk and appropriate interventions in the flowsheet.   Outcome: Progressing Towards Goal  Note: Fall Risk Interventions:  Mobility Interventions: Assess mobility with egress test, Communicate number of staff needed for ambulation/transfer, Patient to call before getting OOB         Medication Interventions: Patient to call before getting OOB, Teach patient to arise slowly    Elimination Interventions: Call light in reach, Patient to call for help with toileting needs              Problem: Patient Education: Go to Patient Education Activity  Goal: Patient/Family Education  Outcome: Progressing Towards Goal     Problem: Pain  Goal: *Control of Pain  Outcome: Progressing Towards Goal  Goal: *PALLIATIVE CARE:  Alleviation of Pain  Outcome: Progressing Towards Goal     Problem: Patient Education: Go to Patient Education Activity  Goal: Patient/Family Education  Outcome: Progressing Towards Goal     Problem: Breathing Pattern - Ineffective  Goal: *Absence of hypoxia  Outcome: Progressing Towards Goal  Goal: *Use of effective breathing techniques  Outcome: Progressing Towards Goal  Goal: *PALLIATIVE CARE:  Alleviation of Dyspnea  Outcome: Progressing Towards Goal     Problem: Patient Education: Go to Patient Education Activity  Goal: Patient/Family Education  Outcome: Progressing Towards Goal     Problem: Anemia Care Plan (Adult and Pediatric)  Goal: *Labs within defined limits  Outcome: Progressing Towards Goal  Goal: *Tolerates increased activity  Outcome: Progressing Towards Goal Manual Repair Warning Statement: We plan on removing the manually selected variable below in favor of our much easier automatic structured text blocks found in the previous tab. We decided to do this to help make the flow better and give you the full power of structured data. Manual selection is never going to be ideal in our platform and I would encourage you to avoid using manual selection from this point on, especially since I will be sunsetting this feature. It is important that you do one of two things with the customized text below. First, you can save all of the text in a word file so you can have it for future reference. Second, transfer the text to the appropriate area in the Library tab. Lastly, if there is a flap or graft type which we do not have you need to let us know right away so I can add it in before the variable is hidden. No need to panic, we plan to give you roughly 6 months to make the change.